# Patient Record
Sex: FEMALE | Race: WHITE | NOT HISPANIC OR LATINO | Employment: OTHER | URBAN - METROPOLITAN AREA
[De-identification: names, ages, dates, MRNs, and addresses within clinical notes are randomized per-mention and may not be internally consistent; named-entity substitution may affect disease eponyms.]

---

## 2017-01-09 ENCOUNTER — HOSPITAL ENCOUNTER (OUTPATIENT)
Dept: LAB | Facility: MEDICAL CENTER | Age: 72
End: 2017-01-09
Attending: FAMILY MEDICINE
Payer: MEDICARE

## 2017-01-09 DIAGNOSIS — E55.9 VITAMIN D DEFICIENCY: ICD-10-CM

## 2017-01-09 DIAGNOSIS — E03.8 SUBCLINICAL HYPOTHYROIDISM: ICD-10-CM

## 2017-01-09 LAB
25(OH)D3 SERPL-MCNC: 61 NG/ML (ref 30–100)
TSH SERPL DL<=0.005 MIU/L-ACNC: 2.69 UIU/ML (ref 0.3–3.7)

## 2017-01-09 PROCEDURE — 82306 VITAMIN D 25 HYDROXY: CPT

## 2017-01-09 PROCEDURE — 36415 COLL VENOUS BLD VENIPUNCTURE: CPT

## 2017-01-09 PROCEDURE — 84443 ASSAY THYROID STIM HORMONE: CPT

## 2017-01-11 ENCOUNTER — PATIENT MESSAGE (OUTPATIENT)
Dept: MEDICAL GROUP | Age: 72
End: 2017-01-11

## 2017-01-11 NOTE — TELEPHONE ENCOUNTER
From: Lia Da Silva  To: Meena Landon M.D.  Sent: 1/11/2017 12:48 PM PST  Subject: Non-Urgent Medical Question          Dr. Landon,    Will you be able to give me a pelvic exam when I see you for my appointment on January 20?    Lia Da Silva

## 2017-01-20 ENCOUNTER — OFFICE VISIT (OUTPATIENT)
Dept: MEDICAL GROUP | Age: 72
End: 2017-01-20
Payer: MEDICARE

## 2017-01-20 VITALS
DIASTOLIC BLOOD PRESSURE: 78 MMHG | SYSTOLIC BLOOD PRESSURE: 102 MMHG | HEART RATE: 86 BPM | HEIGHT: 65 IN | WEIGHT: 130 LBS | TEMPERATURE: 98.3 F | OXYGEN SATURATION: 98 % | BODY MASS INDEX: 21.66 KG/M2

## 2017-01-20 DIAGNOSIS — E78.5 DYSLIPIDEMIA: ICD-10-CM

## 2017-01-20 DIAGNOSIS — E78.5 HYPERLIPIDEMIA WITH TARGET LDL LESS THAN 130: ICD-10-CM

## 2017-01-20 DIAGNOSIS — Z23 NEED FOR VACCINATION: ICD-10-CM

## 2017-01-20 DIAGNOSIS — D72.819 LEUKOPENIA, UNSPECIFIED TYPE: ICD-10-CM

## 2017-01-20 PROCEDURE — 99214 OFFICE O/P EST MOD 30 MIN: CPT | Performed by: FAMILY MEDICINE

## 2017-01-20 RX ORDER — PRAVASTATIN SODIUM 20 MG
TABLET ORAL
Qty: 90 TAB | Refills: 2 | Status: SHIPPED | OUTPATIENT
Start: 2017-01-20 | End: 2017-02-17

## 2017-01-20 NOTE — PROGRESS NOTES
Subjective:     Chief Complaint   Patient presents with   • Hyperlipidemia     lab review     Lia Da Silva is a 71 y.o. female here today for issues listed below    Dyslipidemia: Chronic condition. Last labs are at goal.  Currently taking pravastatin as directed.  Denies side effects--no myalgias or abdominal pain.  Reports diet is: appropriate diet  She is exercising regularly.  She denies dizziness, claudication, or chest pain.  She has chronic leukopenia. She has seen hematology in the past. No additional evaluation was advised. She denies fevers chills or sweats. Denies unusually frequent illness.  She is due for immunizations for TDAP and pneumococcal vaccine. She would prefer to have these at a pharmacy if co-pay is lower.         Allergies: Review of patient's allergies indicates no known allergies.  Current medicines (including changes today)  Current Outpatient Prescriptions   Medication Sig Dispense Refill   • tetanus-diphth-acell pertussis (BOOSTRIX, AGES 7 & OLDER,) 5-2.5-18.5 LF-MCG/0.5 Suspension 0.5 mL by Intramuscular route Once PRN for up to 1 dose. May use Adacel or Boostrix. 0.5 mL 0   • pneumococcal vaccine (PNEUMOVAX-23) 25 MCG/0.5ML Injection 0.5 mL by Intramuscular route Once for 1 dose. 0.5 mL 0   • pravastatin (PRAVACHOL) 20 MG Tab TAKE 1 TABLET ORALLY AT BEDTIME 90 Tab 2   • Cholecalciferol (VITAMIN D3) 2000 UNIT CAPS Take 6,000 Units by mouth.     • aspirin EC (ECOTRIN) 81 MG TBEC Take 81 mg by mouth every day.         No current facility-administered medications for this visit.       She  has a past medical history of Leukopenia (3/21/2012); Hyperlipidemia LDL goal < 130 (3/21/2012); Basal cell carcinoma of skin; and Melanoma of back (CMS-HCC) (6/2016).  Health Maintenance: Mammogram is scheduled for next month  ROS  Constitutional: Negative for fever, chills/sweats   Respiratory: Negative for shortness of breath, SIMMS  Cardiovascular: Negative for chest pain or pressure  GI/:  "Negative for diarrhea/constipation / urinary difficulty  All other systems reviewed and are negative except as per HPI.        Objective:     Blood pressure 102/78, pulse 86, temperature 36.8 °C (98.3 °F), height 1.638 m (5' 4.5\"), weight 58.968 kg (130 lb), last menstrual period 03/01/2004, SpO2 98 %. Body mass index is 21.98 kg/(m^2).  Physical Exam:  Alert, oriented in no acute distress.  Eye contact is good, speech goal directed, affect calm  HEENT: conjunctiva non-injected, sclera non-icteric.  Pinna normal without skin lesions. TM pearly gray.   Oral mucous membranes pink and moist with no lesions.  Neck No adenopathy or masses in the neck or supraclavicular regions.  No carotid bruits. No thyromegaly  Lungs: clear to auscultation bilaterally with good excursion.  CV: regular rate and rhythm.  Abdomen No CVAT  Ext: no edema, no tenderness to palpation over shins      Assessment and Plan:     Chronic condition of hyperlipidemia has been stable and very well controlled. Continue current medications.  Treatment Changes: None  Lia was seen today for hyperlipidemia.    Diagnoses and all orders for this visit:    Hyperlipidemia with target LDL less than 130  -     LIPID PROFILE; Future    Need for vaccination  -     tetanus-diphth-acell pertussis (BOOSTRIX, AGES 7 & OLDER,) 5-2.5-18.5 LF-MCG/0.5 Suspension; 0.5 mL by Intramuscular route Once PRN for up to 1 dose. May use Adacel or Boostrix.  -     pneumococcal vaccine (PNEUMOVAX-23) 25 MCG/0.5ML Injection; 0.5 mL by Intramuscular route Once for 1 dose.    Dyslipidemia  -     pravastatin (PRAVACHOL) 20 MG Tab; TAKE 1 TABLET ORALLY AT BEDTIME  -     COMP METABOLIC PANEL; Future  -     LIPID PROFILE; Future  -     TSH WITH REFLEX TO FT4; Future    Leukopenia, unspecified type        Followup: Return in about 9 months (around 11/3/2017) for AWV with health . sooner should new symptoms or problems arise.           "

## 2017-01-20 NOTE — MR AVS SNAPSHOT
"        Lia Kathrine Da Silva   2017 8:50 AM   Office Visit   MRN: [id number]    Department:  [address] [place] [address]   Dept Phone:  [phone]    Description:  Female : 1945   Provider:  Meena Landon M.D.           Reason for Visit     Hyperlipidemia lab review      Allergies as of 2017     No Known Allergies      You were diagnosed with     Hyperlipidemia with target LDL less than 130   [207149]       Need for vaccination   [989965]       Dyslipidemia   [040592]       Leukopenia, unspecified type   [1792843]         Vital Signs     Blood Pressure Pulse Temperature Height Weight Body Mass Index    102/78 mmHg 86 36.8 °C (98.3 °F) 1.638 m (5' 4.5\") 58.968 kg (130 lb) 21.98 kg/m2    Oxygen Saturation Last Menstrual Period Smoking Status             98% 2004 Never Smoker          Basic Information     Date Of Birth Sex Race Ethnicity Preferred Language    1945 Female White Non- English      Your appointments     2017 10:10 AM   ANNUAL EXAM PREVENTATIVE with Meena Landon M.D.   [address])    [address] [place] [address] [place] [address] [phone]            2017 10:40 AM   MA SCRN10 with S HENRY MG 1   [facility] IM[facility] MAMMOGRAPHY ([place])    [address]  [place] [address] [phone]           No deodorant, powder, perfume or lotion under the arm or breast area.              Problem List              ICD-10-CM Priority Class Noted - Resolved    Leukopenia D72.819   3/21/2012 - Present    Hyperlipidemia with target LDL less than 130 E78.5   3/21/2012 - Present    Osteopenia with high risk of fracture M85.80   3/21/2012 - Present    Preventative health care Z00.00   2012 - Present    H/O basal cell carcinoma excision Z85.828, Z98.890   2015 - Present    H/O melanoma excision Z98.890, Z85.820   7/15/2016 - Present      Health Maintenance        Date Due Completion Dates    IMM " DTaP/Tdap/Td Vaccine (1 - Tdap) 11/1/1964 ---    IMM PNEUMOCOCCAL 65+ (ADULT) LOW/MEDIUM RISK SERIES (2 of 2 - PPSV23) 5/29/2016 5/29/2015, 10/31/2010    MAMMOGRAM 11/4/2016 11/4/2015, 9/11/2014, 4/18/2014, 9/10/2013, 8/13/2012, 8/10/2011, 8/9/2010, 8/9/2010, 7/29/2009, 7/29/2009    BONE DENSITY 11/4/2017 11/4/2015, 3/28/2012    COLONOSCOPY 10/15/2019 10/15/2009, 10/15/2009 (Done), 10/15/2009, 10/1/2004 (Done)    Override on 10/15/2009: Done    Override on 10/1/2004: Done            Current Immunizations     13-VALENT PCV PREVNAR 5/29/2015  1:02 PM    Hep A/HEP B Combined Vaccine (TwinRix) 7/24/2009, 1/30/2009, 12/19/2008    IPV 7/15/2013    Influenza TIV (IM) 9/25/2014    Influenza Vaccine Adult HD 10/3/2016, 9/4/2015    Influenza Vaccine Quad Inj (Preserved) 9/24/2013, 10/14/2011, 10/31/2010    Pneumococcal polysaccharide vaccine (PPSV-23) 10/31/2010    SHINGLES VACCINE 7/23/2008, 11/1/2006    Yellow Fever Vaccine 6/4/2013      Below and/or attached are the medications your provider expects you to take. Review all of your home medications and newly ordered medications with your provider and/or pharmacist. Follow medication instructions as directed by your provider and/or pharmacist. Please keep your medication list with you and share with your provider. Update the information when medications are discontinued, doses are changed, or new medications (including over-the-counter products) are added; and carry medication information at all times in the event of emergency situations     Allergies:  No Known Allergies          Medications  Valid as of: January 20, 2017 -  9:10 AM    Generic Name Brand Name Tablet Size Instructions for use    Aspirin (Tablet Delayed Response) ECOTRIN 81 MG Take 81 mg by mouth every day.          Cholecalciferol (Cap) Vitamin D3 2000 UNIT Take 6,000 Units by mouth.        Pneumococcal Vac Polyvalent (Injection) PNEUMOVAX-23 25 MCG/0.5ML 0.5 mL by Intramuscular route Once for 1 dose.         Pravastatin Sodium (Tab) PRAVACHOL 20 MG TAKE 1 TABLET ORALLY AT BEDTIME        Tetanus-Diphth-Acell Pertussis (Suspension) BOOSTRIX (Ages 7 & Older) 5-2.5-18.5 LF-MCG/0.5 0.5 mL by Intramuscular route Once PRN for up to 1 dose. May use Adacel or Boostrix.        .                 Medicines prescribed today were sent to:     [facility]/PHARMACY #[id number] - [place], [place] - [address]    [address] [place] [address]    Phone: [phone] Fax: [phone]    Open 24 Hours?: No      Medication refill instructions:       If your prescription bottle indicates you have medication refills left, it is not necessary to call your provider’s office. Please contact your pharmacy and they will refill your medication.    If your prescription bottle indicates you do not have any refills left, you may request refills at any time through one of the following ways: The online [employer] system (except Urgent Care), by calling your provider’s office, or by asking your pharmacy to contact your provider’s office with a refill request. Medication refills are processed only during regular business hours and may not be available until the next business day. Your provider may request additional information or to have a follow-up visit with you prior to refilling your medication.   *Please Note: Medication refills are assigned a new Rx number when refilled electronically. Your pharmacy may indicate that no refills were authorized even though a new prescription for the same medication is available at the pharmacy. Please request the medicine by name with the pharmacy before contacting your provider for a refill.        Your To Do List     Future Labs/Procedures Complete By Expires    COMP METABOLIC PANEL  7/19/2017 1/20/2018    LIPID PROFILE  7/19/2017 1/20/2018    TSH WITH REFLEX TO FT4  7/19/2017 1/20/2018      Other Notes About Your Plan                  [employer] Access Code: Activation code not generated  Current [employer] Status: Active

## 2017-01-24 ENCOUNTER — RX ONLY (OUTPATIENT)
Age: 72
Setting detail: RX ONLY
End: 2017-01-24

## 2017-01-31 ENCOUNTER — TELEPHONE (OUTPATIENT)
Dept: MEDICAL GROUP | Age: 72
End: 2017-01-31

## 2017-01-31 DIAGNOSIS — Z23 NEED FOR VACCINATION: ICD-10-CM

## 2017-01-31 NOTE — TELEPHONE ENCOUNTER
Phone Number Called: [phone] (home)     Message: Spoke with patient and made an MA Visit to get vaccines done 2/1/17    Left Message for patient to call back: N\A

## 2017-01-31 NOTE — TELEPHONE ENCOUNTER
Phone Number Called: [phone] (home)     Message: left message for to call back in regard to needing vaccines PPSV23-Tdap    Left Message for patient to call back: yes

## 2017-01-31 NOTE — TELEPHONE ENCOUNTER
Please addend documentation below. One message says pt needs PCV-13 and the other says PPSV-23.  (she is due for PPSV-23 and Tdap - those orders are signed)  Meena Landon MD  [facility] [address] [place] [address]

## 2017-01-31 NOTE — TELEPHONE ENCOUNTER
1. Caller Name: Lia Da Silva                                         Call Back Number: [phone] (home)       Patient approves a detailed voicemail message: yes    2. SPECIFIC Action To Be Taken: Orders pending, please sign.    3. Diagnosis/Clinical Reason for Request: Tdap, PPSV23    4. Specialty & Provider Name/Lab/Imaging Location: NA    5. Is appointment scheduled for requested order/referral: no    Patient informed they will receive a return phone call from the office ONLY if there are any questions before processing their request. Advised to call back if they haven't received a call from the referral department in 5 days.

## 2017-02-01 ENCOUNTER — APPOINTMENT (OUTPATIENT)
Dept: MEDICAL GROUP | Age: 72
End: 2017-02-01
Payer: MEDICARE

## 2017-02-02 ENCOUNTER — NON-PROVIDER VISIT (OUTPATIENT)
Dept: MEDICAL GROUP | Age: 72
End: 2017-02-02
Payer: MEDICARE

## 2017-02-02 DIAGNOSIS — Z23 NEED FOR VACCINATION: ICD-10-CM

## 2017-02-02 PROCEDURE — 90715 TDAP VACCINE 7 YRS/> IM: CPT | Performed by: FAMILY MEDICINE

## 2017-02-02 PROCEDURE — G0009 ADMIN PNEUMOCOCCAL VACCINE: HCPCS | Performed by: FAMILY MEDICINE

## 2017-02-02 PROCEDURE — 90472 IMMUNIZATION ADMIN EACH ADD: CPT | Performed by: FAMILY MEDICINE

## 2017-02-02 PROCEDURE — 90732 PPSV23 VACC 2 YRS+ SUBQ/IM: CPT | Performed by: FAMILY MEDICINE

## 2017-02-02 PROCEDURE — 99999 PR NO CHARGE: CPT | Performed by: FAMILY MEDICINE

## 2017-02-02 NOTE — MR AVS SNAPSHOT
Lia Da Silva   2017 9:15 AM   Non-Provider Visit   MRN: [id number]    Department:  [address] [place] [address]   Dept Phone:  [phone]    Description:  Female : 1945   Provider:  AMOS MONTELONGO MA           Reason for Visit     Immunizations Tdap, PPSV23      Allergies as of 2017     No Known Allergies      You were diagnosed with     Need for vaccination   [[id number]]         Vital Signs     Last Menstrual Period Smoking Status                2004 Never Smoker           Basic Information     Date Of Birth Sex Race Ethnicity Preferred Language    1945 Female White Non- English      Your appointments     2017 10:10 AM   ANNUAL EXAM PREVENTATIVE with Meena Landon M.D.   [facility] [address] [place] [address]    [address] [place] [address]   [phone]            2017 10:40 AM   MA SCRN10 with S HENRY MG 1   [facility] IMAGING [facility] MAMMOGRAPHY ([place])    [address]  [place] [address] [phone]           No deodorant, powder, perfume or lotion under the arm or breast area.              Problem List              ICD-10-CM Priority Class Noted - Resolved    Leukopenia D72.819   3/21/2012 - Present    Hyperlipidemia with target LDL less than 130 E78.5   3/21/2012 - Present    Osteopenia with high risk of fracture M85.80   3/21/2012 - Present    Preventative health care Z00.00   2012 - Present    H/O basal cell carcinoma excision Z85.828, Z98.890   2015 - Present    H/O melanoma excision Z98.890, Z85.820   7/15/2016 - Present      Health Maintenance        Date Due Completion Dates    MAMMOGRAM 2016, 2014, 2014, 9/10/2013, 2012, 8/10/2011, 2010, 2010, 2009, 2009    BONE DENSITY 2017, 3/28/2012    COLONOSCOPY 10/15/2019 10/15/2009, 10/15/2009 (Done), 10/15/2009, 10/1/2004 (Done)    Override on 10/15/2009: Done    Override on  10/1/2004: Done    IMM DTaP/Tdap/Td Vaccine (2 - Td) 2/2/2027 2/2/2017            Current Immunizations     13-VALENT PCV PREVNAR 5/29/2015  1:02 PM    Hep A/HEP B Combined Vaccine (TwinRix) 7/24/2009, 1/30/2009, 12/19/2008    IPV 7/15/2013    Influenza TIV (IM) 9/25/2014    Influenza Vaccine Adult HD 10/3/2016, 9/4/2015    Influenza Vaccine Quad Inj (Preserved) 9/24/2013, 10/14/2011, 10/31/2010    Pneumococcal polysaccharide vaccine (PPSV-23) 2/2/2017, 10/31/2010    SHINGLES VACCINE 7/23/2008, 11/1/2006    Tdap Vaccine 2/2/2017    Yellow Fever Vaccine 6/4/2013      Below and/or attached are the medications your provider expects you to take. Review all of your home medications and newly ordered medications with your provider and/or pharmacist. Follow medication instructions as directed by your provider and/or pharmacist. Please keep your medication list with you and share with your provider. Update the information when medications are discontinued, doses are changed, or new medications (including over-the-counter products) are added; and carry medication information at all times in the event of emergency situations     Allergies:  No Known Allergies          Medications  Valid as of: February 02, 2017 -  9:55 AM    Generic Name Brand Name Tablet Size Instructions for use    Aspirin (Tablet Delayed Response) ECOTRIN 81 MG Take 81 mg by mouth every day.          Cholecalciferol (Cap) Vitamin D3 2000 UNIT Take 6,000 Units by mouth.        Pravastatin Sodium (Tab) PRAVACHOL 20 MG TAKE 1 TABLET ORALLY AT BEDTIME        Tetanus-Diphth-Acell Pertussis (Suspension) BOOSTRIX (Ages 7 & Older) 5-2.5-18.5 LF-MCG/0.5 0.5 mL by Intramuscular route Once PRN for up to 1 dose. May use Adacel or Boostrix.        .                 Medicines prescribed today were sent to:     [facility]/PHARMACY #[id number] - [place], [place] - [address]    [address] [place] [address]    Phone: [phone] Fax: [phone]    Open 24 Hours?: No         Medication refill instructions:       If your prescription bottle indicates you have medication refills left, it is not necessary to call your provider’s office. Please contact your pharmacy and they will refill your medication.    If your prescription bottle indicates you do not have any refills left, you may request refills at any time through one of the following ways: The online [employer] system (except Urgent Care), by calling your provider’s office, or by asking your pharmacy to contact your provider’s office with a refill request. Medication refills are processed only during regular business hours and may not be available until the next business day. Your provider may request additional information or to have a follow-up visit with you prior to refilling your medication.   *Please Note: Medication refills are assigned a new Rx number when refilled electronically. Your pharmacy may indicate that no refills were authorized even though a new prescription for the same medication is available at the pharmacy. Please request the medicine by name with the pharmacy before contacting your provider for a refill.        Other Notes About Your Plan                  [employer] Access Code: Activation code not generated  Current [employer] Status: Active

## 2017-02-02 NOTE — PROGRESS NOTES
"Lia Da Silva is a 71 y.o. female here for a non-provider visit for:   TDAP, PPSV23    Reason for immunization: continue or complete series started at the office  Immunization records indicate need for vaccine: Yes  Minimum interval has been met for this vaccine: Yes  ABN completed: Not Indicated    VIS Dated  4/25/15 was given to patient Yes  All IAC Questionnaire questions were answered “No.\"    Patient tolerated injection and no adverse effects were observed or reported .    Pt scheduled for next dose in series: No  Verified by DS      FYI ONLY    Phone Number Called: [phone] (home)     Message: Patient was needing a letter for family stating that she has no symptoms of dementia.    Left Message for patient to call back: yes            "

## 2017-02-03 NOTE — PROGRESS NOTES
Patient can request a release of records of her last AWV (July 2016) which indicates her cognitive screen is normal.  SAM

## 2017-02-03 NOTE — PROGRESS NOTES
Left message for patient to return call.   Please inform patient to contact medical records to request her last AWV at [phone]

## 2017-02-07 RX ORDER — PRAVASTATIN SODIUM 20 MG
TABLET ORAL
Qty: 90 TAB | Refills: 2 | Status: SHIPPED | OUTPATIENT
Start: 2017-02-07 | End: 2018-02-26 | Stop reason: SDUPTHER

## 2017-02-07 NOTE — TELEPHONE ENCOUNTER
Refill done and sent electronically to pharmacy selected for encounter.  Meena Landon MD  [facility] [address] [place] [address]

## 2017-02-17 ENCOUNTER — OFFICE VISIT (OUTPATIENT)
Dept: MEDICAL GROUP | Age: 72
End: 2017-02-17
Payer: MEDICARE

## 2017-02-17 VITALS
DIASTOLIC BLOOD PRESSURE: 86 MMHG | SYSTOLIC BLOOD PRESSURE: 122 MMHG | HEIGHT: 66 IN | WEIGHT: 131.2 LBS | BODY MASS INDEX: 21.08 KG/M2 | HEART RATE: 78 BPM | OXYGEN SATURATION: 100 % | TEMPERATURE: 100.9 F

## 2017-02-17 DIAGNOSIS — E78.5 HYPERLIPIDEMIA WITH TARGET LDL LESS THAN 130: ICD-10-CM

## 2017-02-17 DIAGNOSIS — M85.80 OSTEOPENIA WITH HIGH RISK OF FRACTURE: ICD-10-CM

## 2017-02-17 DIAGNOSIS — Z01.419 WELL FEMALE EXAM WITH ROUTINE GYNECOLOGICAL EXAM: Primary | ICD-10-CM

## 2017-02-17 DIAGNOSIS — R79.89 ELEVATED TSH: ICD-10-CM

## 2017-02-17 DIAGNOSIS — D72.819 LEUKOPENIA, UNSPECIFIED TYPE: ICD-10-CM

## 2017-02-17 PROCEDURE — G0101 CA SCREEN;PELVIC/BREAST EXAM: HCPCS | Performed by: FAMILY MEDICINE

## 2017-02-17 PROCEDURE — G8432 DEP SCR NOT DOC, RNG: HCPCS | Performed by: FAMILY MEDICINE

## 2017-02-17 RX ORDER — IMIQUIMOD 12.5 MG/.25G
CREAM TOPICAL
COMMUNITY
Start: 2017-01-24 | End: 2018-05-07

## 2017-02-17 NOTE — PROGRESS NOTES
SUBJECTIVE: 71 y.o. female for annual routine gynecologic exam  Chief Complaint   Patient presents with   • Gynecologic Exam       Obstetric History       T0      TAB0   SAB0   E0   M0   L1    Obstetric Comments   And adopted 2 children        History   Sexual Activity   • Sexual Activity:   • Partners: Male     Sexual history: currently sexually active, single partner   H/O Abnormal Pap no  She  reports that she has never smoked. She has never used smokeless tobacco.    LMP Date: 97   Allergies: Review of patient's allergies indicates no known allergies.     ROS:    Reports mild, variable menopause symptoms of hot flashes, night sweats, sleep disruption, mood changes.Reports vaginal dryness.   No significant bloating/fluid retention, pelvic pain, or dyspareunia. No vaginal discharge   No breast tenderness, mass, nipple discharge, changes in size or contour, or abnormal cyclic discomfort.  No urinary tract symptoms, no incontinence, no polydipsia, polyuria,  No abdominal pain, change in bowel habits, black or bloody stools.    No unusual headaches, no visual changes, menstrual migraines   No prolonged cough. No dyspnea or chest pain on exertion.  No depression, labile mood, anxiety, libido changes, insomnia.  No temperature intolerance.  Sees Derm for skin cancer monitoring     Exercise: moderate regular exercise program walks. Plans some yoga  Preventive Care mammo is scheduled.     Current medicines (including changes today)  Current Outpatient Prescriptions   Medication Sig Dispense Refill   • imiquimod (ALDARA) 5 % cream      • pravastatin (PRAVACHOL) 20 MG Tab TAKE 1 TABLET ORALLY AT BEDTIME 90 Tab 2   • Cholecalciferol (VITAMIN D3) 2000 UNIT CAPS Take 6,000 Units by mouth.     • aspirin EC (ECOTRIN) 81 MG TBEC Take 81 mg by mouth every day.         No current facility-administered medications for this visit.     She  has a past medical history of Leukopenia (3/21/2012); Hyperlipidemia LDL  "goal < 130 (3/21/2012); Basal cell carcinoma of skin; and Melanoma of back (CMS-HCC) (6/2016).  She  has past surgical history that includes tonsillectomy; primary c section; breast biopsy; bone marrow aspiration (11/7/2012); and bone marrow biopsy, ndl/trocar (11/7/2012).     Family History:   Family History   Problem Relation Age of Onset   • Genetic Father      alzheimers d. 72   • Hyperlipidemia Father    • Hypertension Father    • Cancer Mother      lymphoma d. 86   • Diabetes Brother      resolved with lifestyle mod       OBJECTIVE:   /86 mmHg  Pulse 78  Temp(Src) 38.3 °C (100.9 °F)  Ht 1.679 m (5' 6.1\")  Wt 59.512 kg (131 lb 3.2 oz)  BMI 21.11 kg/m2  SpO2 100%  LMP 11/01/1997  Breastfeeding? No  Body mass index is 21.11 kg/(m^2).    HEAD AND NECK:  Ears normal.  Throat, oral cavity and tongue normal.  Neck supple. No adenopathy or masses in the neck or supraclavicular regions.  No carotid bruits. No thyromegaly. NEURO: Cranial nerves are normal. DTR's normal and symmetric.  CHEST:  Clear, good air entry, no wheezes or rales. HEART:  Regular rate and rhythm.  S1 and S2 normal.   No edema or JVD. ABDOMEN:  Soft without tenderness, guarding, mass or organomegaly.  No CVA tenderness or inguinal adenopathy. EXTREMITIES:  Extremities, reflexes and peripheral pulses are normal. SKIN: color normal, vascularity normal, no edema, temperature normal   No rashes or suspicious skin lesions noted.     Breast Exam: Performed with instruction during examination. No axillary lymphadenopathy, no skin changes, no dominant masses. No nipple retraction  Pelvic Exam -  Normal external genitalia with no lesions. Vaginal Mucosa:  atrophic vaginal mucosa . Cervix with no visible lesions. No cervical motion tenderness. Uterus is normal sized with no masses. No adnexal tenderness or enlargement appreciated. Thin Prep Pap is not obtained, vaginal swab is not obtained  Rectal: sphincter tone normal, no mass    <ASSESSMENT " and PLAN>  1. Well female exam with routine gynecological exam     2. Hyperlipidemia with target LDL less than 130  LIPID PROFILE    COMP METABOLIC PANEL   3. Elevated TSH  TSH WITH REFLEX TO FT4   4. Leukopenia, unspecified type     5. Osteopenia with high risk of fracture         Discussed  breast self exam, mammography screening, menopause, osteoporosis, adequate intake of calcium and vitamin D, diet and exercise   Recheck DEXA later this year. If continued high risk fracture will advise treatment.   Follow-up in 2 years for next Gyn exam  Next office visit for recheck of chronic medical conditions is due in 1 year.

## 2017-02-17 NOTE — Clinical Note
February 17, 2017         Lia Da Silva  [address] [place] [address]        Dear Lia:      On 7/15/16 you had a completely normal cognition screen.  We will check again in November.         Sincerely,      Meena Landon M.D.    Electronically Signed

## 2017-02-17 NOTE — MR AVS SNAPSHOT
"        Lia Da Silva   2017 10:10 AM   Office Visit   MRN: [id number]    Department:  [address] [place] [address]   Dept Phone:  [phone]    Description:  Female : 1945   Provider:  Meena Landon M.D.           Reason for Visit     Gynecologic Exam           Allergies as of 2017     No Known Allergies      You were diagnosed with     Hyperlipidemia with target LDL less than 130   [211525]       Elevated TSH   [613964]       Leukopenia, unspecified type   [4201623]       Osteopenia with high risk of fracture   [6858457]         Vital Signs     Blood Pressure Pulse Temperature Height Weight Body Mass Index    122/86 mmHg 78 38.3 °C (100.9 °F) 1.679 m (5' 6.1\") 59.512 kg (131 lb 3.2 oz) 21.11 kg/m2    Oxygen Saturation Last Menstrual Period Breastfeeding? Smoking Status          100% 1997 No Never Smoker         Basic Information     Date Of Birth Sex Race Ethnicity Preferred Language    1945 Female White Non- English      Your appointments     2017 10:40 AM   MA SCRN10 with S [place] MG 1   [facility] ([place])    [address]  [place] [address] [phone]           No deodorant, powder, perfume or lotion under the arm or breast area.            2017  7:30 AM   Established Patient with Meena Landon M.D.   [address] [place] [address] [phone]           You will be receiving a confirmation call a few days before your appointment from our automated call confirmation system.              Problem List              ICD-10-CM Priority Class Noted - Resolved    Leukopenia D72.819   3/21/2012 - Present    Hyperlipidemia with target LDL less than 130 E78.5   3/21/2012 - Present    Osteopenia with high risk of fracture M85.80   3/21/2012 - Present    Preventative health care Z00.00   2012 - Present    H/O basal cell carcinoma excision Z85.828, " Z98.890   5/29/2015 - Present    H/O melanoma excision Z98.890, Z85.820   7/15/2016 - Present      Health Maintenance        Date Due Completion Dates    MAMMOGRAM 11/4/2016 11/4/2015, 9/11/2014, 4/18/2014, 9/10/2013, 8/13/2012, 8/10/2011, 8/9/2010, 8/9/2010, 7/29/2009, 7/29/2009    BONE DENSITY 11/4/2017 11/4/2015, 3/28/2012    COLONOSCOPY 10/15/2019 10/15/2009, 10/15/2009 (Done), 10/15/2009, 10/1/2004 (Done)    Override on 10/15/2009: Done    Override on 10/1/2004: Done    IMM DTaP/Tdap/Td Vaccine (2 - Td) 2/2/2027 2/2/2017            Current Immunizations     13-VALENT PCV PREVNAR 5/29/2015  1:02 PM    Hep A/HEP B Combined Vaccine (TwinRix) 7/24/2009, 7/24/2009, 1/30/2009, 1/30/2009, 12/19/2008, 12/19/2008    IPV 7/15/2013    Influenza TIV (IM) 9/25/2014    Influenza Vaccine Adult HD 10/3/2016, 9/4/2015    Influenza Vaccine Quad Inj (Preserved) 9/24/2013, 10/14/2011, 10/31/2010    Pneumococcal polysaccharide vaccine (PPSV-23) 2/2/2017, 10/31/2010    SHINGLES VACCINE 7/23/2008, 11/1/2006    Tdap Vaccine 2/2/2017    Yellow Fever Vaccine 6/4/2013      Below and/or attached are the medications your provider expects you to take. Review all of your home medications and newly ordered medications with your provider and/or pharmacist. Follow medication instructions as directed by your provider and/or pharmacist. Please keep your medication list with you and share with your provider. Update the information when medications are discontinued, doses are changed, or new medications (including over-the-counter products) are added; and carry medication information at all times in the event of emergency situations     Allergies:  No Known Allergies          Medications  Valid as of: February 17, 2017 - 10:36 AM    Generic Name Brand Name Tablet Size Instructions for use    Aspirin (Tablet Delayed Response) ECOTRIN 81 MG Take 81 mg by mouth every day.          Cholecalciferol (Cap) Vitamin D3 2000 UNIT Take 6,000 Units by mouth.           Imiquimod (Cream) ALDARA 5 %         Pravastatin Sodium (Tab) PRAVACHOL 20 MG TAKE 1 TABLET ORALLY AT BEDTIME        .                 Medicines prescribed today were sent to:     [facility]ARMACY #[id number] - [place], [place] - [address]    [address] [place] [address]    Phone: [phone] Fax: [phone]    Open 24 Hours?: No      Medication refill instructions:       If your prescription bottle indicates you have medication refills left, it is not necessary to call your provider’s office. Please contact your pharmacy and they will refill your medication.    If your prescription bottle indicates you do not have any refills left, you may request refills at any time through one of the following ways: The online [email or web address] system (except Urgent Care), by calling your provider’s office, or by asking your pharmacy to contact your provider’s office with a refill request. Medication refills are processed only during regular business hours and may not be available until the next business day. Your provider may request additional information or to have a follow-up visit with you prior to refilling your medication.   *Please Note: Medication refills are assigned a new Rx number when refilled electronically. Your pharmacy may indicate that no refills were authorized even though a new prescription for the same medication is available at the pharmacy. Please request the medicine by name with the pharmacy before contacting your provider for a refill.        Your To Do List     Future Labs/Procedures Complete By Expires    COMP METABOLIC PANEL  10/16/2017 2/17/2018    LIPID PROFILE  10/16/2017 2/17/2018    TSH WITH REFLEX TO FT4  10/16/2017 2/17/2018      Other Notes About Your Plan                  [email or web address] Access Code: Activation code not generated  Current [email or web address] Status: Active

## 2017-02-28 ENCOUNTER — HOSPITAL ENCOUNTER (OUTPATIENT)
Dept: RADIOLOGY | Facility: MEDICAL CENTER | Age: 72
End: 2017-02-28
Attending: FAMILY MEDICINE
Payer: MEDICARE

## 2017-02-28 DIAGNOSIS — Z13.9 SCREENING: ICD-10-CM

## 2017-02-28 PROCEDURE — 77063 BREAST TOMOSYNTHESIS BI: CPT

## 2017-03-07 ENCOUNTER — HOSPITAL ENCOUNTER (OUTPATIENT)
Dept: RADIOLOGY | Facility: MEDICAL CENTER | Age: 72
End: 2017-03-07
Attending: FAMILY MEDICINE
Payer: MEDICARE

## 2017-03-07 DIAGNOSIS — R92.8 ABNORMAL MAMMOGRAM: ICD-10-CM

## 2017-03-07 PROCEDURE — 76642 ULTRASOUND BREAST LIMITED: CPT | Mod: RT

## 2017-03-07 PROCEDURE — G0206 DX MAMMO INCL CAD UNI: HCPCS | Mod: RT

## 2017-03-27 ENCOUNTER — PATIENT MESSAGE (OUTPATIENT)
Dept: MEDICAL GROUP | Age: 72
End: 2017-03-27

## 2017-03-27 DIAGNOSIS — R92.8 ABNORMAL MAMMOGRAM OF RIGHT BREAST: ICD-10-CM

## 2017-03-27 NOTE — TELEPHONE ENCOUNTER
From: Lia Da Silva  To: Meena Landon M.D.  Sent: 3/27/2017 12:58 PM PDT  Subject: Test Result Question    Hello Dr. Landon,    As you know I had a mammography examination and an ultrasound on March 7, 2017. I was asked to make an appointment in 6 months for another ultrasound. When I tried to make that appointment today I was told that I needed to do that through you.  So please keep me posted on that so I can put it on my calendar.    Thank you,  Lia Da Silva

## 2017-04-25 ENCOUNTER — TELEPHONE (OUTPATIENT)
Dept: MEDICAL GROUP | Age: 72
End: 2017-04-25

## 2017-04-25 NOTE — TELEPHONE ENCOUNTER
Phone Number Called: [phone] (home)     Message: Appointment scheduled with Dr. Graham    Left Message for patient to call back: N\A

## 2017-04-26 PROBLEM — Z85.820 PERSONAL HISTORY OF MALIGNANT MELANOMA OF SKIN: Status: ACTIVE | Noted: 2017-04-26

## 2017-04-26 PROBLEM — C44.319 BASAL CELL CARCINOMA OF SKIN OF OTHER PARTS OF FACE: Status: ACTIVE | Noted: 2017-04-26

## 2017-04-26 PROBLEM — D04.72 CARCINOMA IN SITU OF SKIN OF LEFT LOWER LIMB, INCLUDING HIP: Status: ACTIVE | Noted: 2017-04-26

## 2017-05-02 ENCOUNTER — RX ONLY (OUTPATIENT)
Age: 72
Setting detail: RX ONLY
End: 2017-05-02

## 2017-05-08 ENCOUNTER — TELEPHONE (OUTPATIENT)
Dept: MEDICAL GROUP | Age: 72
End: 2017-05-08

## 2017-05-08 NOTE — TELEPHONE ENCOUNTER
ESTABLISHED PATIENT PRE-VISIT PLANNING     Note: Patient will not be contacted if there is no indication to call.     1.  Reviewed note from last office visit with PCP and/or other med group provider: Yes    2.  If any orders were placed at last visit, do we have Results/Consult Notes?        •  Labs - labs order not completed       •  Imaging - Imaging was not ordered at last office visit.       •  Referrals - No referrals were ordered at last office visit.    3.  Immunizations were updated in [facility] using WebIZ?: Yes       •  Web Iz Recommendations: TD    4.  Patient is due for the following Health Maintenance Topics:   There are no preventive care reminders to display for this patient.    - Patient has completed FLU, HEPATITIS A , HEPATITIS B, PNEUMOVAX (PPSV23), PREVNAR (PCV13) , TDAP and ZOSTAVAX (Shingles) Immunization(s) per WebIZ. Chart has been updated.    5.  Patient was not informed to arrive 15 min prior to their scheduled appointment and bring in their medication bottles.

## 2017-05-09 ENCOUNTER — OFFICE VISIT (OUTPATIENT)
Dept: MEDICAL GROUP | Age: 72
End: 2017-05-09
Payer: MEDICARE

## 2017-05-09 VITALS
BODY MASS INDEX: 20.73 KG/M2 | TEMPERATURE: 99.3 F | OXYGEN SATURATION: 98 % | HEIGHT: 66 IN | DIASTOLIC BLOOD PRESSURE: 64 MMHG | WEIGHT: 129 LBS | HEART RATE: 71 BPM | SYSTOLIC BLOOD PRESSURE: 110 MMHG

## 2017-05-09 DIAGNOSIS — R92.8 ABNORMAL MAMMOGRAM OF RIGHT BREAST: ICD-10-CM

## 2017-05-09 DIAGNOSIS — D72.819 LEUKOPENIA, UNSPECIFIED TYPE: ICD-10-CM

## 2017-05-09 DIAGNOSIS — E78.5 HYPERLIPIDEMIA WITH TARGET LDL LESS THAN 130: ICD-10-CM

## 2017-05-09 DIAGNOSIS — M85.80 OSTEOPENIA WITH HIGH RISK OF FRACTURE: ICD-10-CM

## 2017-05-09 PROBLEM — N60.01 CYST OF RIGHT BREAST: Status: ACTIVE | Noted: 2017-05-09

## 2017-05-09 PROCEDURE — 99204 OFFICE O/P NEW MOD 45 MIN: CPT | Performed by: INTERNAL MEDICINE

## 2017-05-09 ASSESSMENT — PAIN SCALES - GENERAL: PAINLEVEL: NO PAIN

## 2017-05-09 NOTE — MR AVS SNAPSHOT
"        Lia Da Silva   2017 4:00 PM   Office Visit   MRN: [id number]    Department:  [address] [place] [address]   Dept Phone:  [phone]    Description:  Female : 1945   Provider:  Gayle Graham M.D.           Reason for Visit     Establish Care           Allergies as of 2017     No Known Allergies      You were diagnosed with     Hyperlipidemia with target LDL less than 130   [085982]       Leukopenia, unspecified type   [7815648]       Osteopenia with high risk of fracture   [2501226]         Vital Signs     Blood Pressure Pulse Temperature Height Weight Body Mass Index    110/64 mmHg 71 37.4 °C (99.3 °F) 1.676 m (5' 5.98\") 58.514 kg (129 lb) 20.83 kg/m2    Oxygen Saturation Last Menstrual Period Breastfeeding? Smoking Status          98% 1997 No Never Smoker         Basic Information     Date Of Birth Sex Race Ethnicity Preferred Language    1945 Female White Non- English      Your appointments     Sep 06, 2017  8:00 AM   MA DX30 with S IVANAAN MG 1   [facility] ([place])    [address]  [facility] [id number] [phone]            Dec 04, 2017  8:20 AM   Established Patient with Gayle Graham M.D.   [address] [id number] [phone]           You will be receiving a confirmation call a few days before your appointment from our automated call confirmation system.              Problem List              ICD-10-CM Priority Class Noted - Resolved    Leukopenia D72.819   3/21/2012 - Present    Hyperlipidemia with target LDL less than 130 E78.5   3/21/2012 - Present    Osteopenia with high risk of fracture M85.80   3/21/2012 - Present    Preventative health care Z00.00   2012 - Present    H/O basal cell carcinoma excision Z85.828, Z98.890   2015 - Present    H/O melanoma excision Z98.890, Z85.820   7/15/2016 - Present      Health Maintenance       " Date Due Completion Dates    BONE DENSITY 11/4/2017 11/4/2015, 3/28/2012    MAMMOGRAM 3/7/2018 3/7/2017, 2/28/2017, 11/4/2015, 9/11/2014, 4/18/2014, 9/10/2013, 8/13/2012, 8/10/2011, 8/9/2010, 8/9/2010, 7/29/2009, 7/29/2009    COLONOSCOPY 10/15/2019 10/15/2009, 10/15/2009 (Done), 10/15/2009, 10/1/2004 (Done)    Override on 10/15/2009: Done    Override on 10/1/2004: Done    IMM DTaP/Tdap/Td Vaccine (2 - Td) 2/2/2027 2/2/2017            Current Immunizations     13-VALENT PCV PREVNAR 5/29/2015  1:02 PM    Hep A/HEP B Combined Vaccine (TwinRix) 7/24/2009, 7/24/2009, 1/30/2009, 1/30/2009, 12/19/2008, 12/19/2008    IPV 7/15/2013    Influenza TIV (IM) 9/25/2014    Influenza Vaccine Adult HD 10/3/2016, 9/4/2015    Influenza Vaccine Quad Inj (Preserved) 9/24/2013, 10/14/2011, 10/31/2010    Pneumococcal polysaccharide vaccine (PPSV-23) 2/2/2017, 10/31/2010    SHINGLES VACCINE 7/23/2008, 11/1/2006    Tdap Vaccine 2/2/2017    Yellow Fever Vaccine 6/4/2013      Below and/or attached are the medications your provider expects you to take. Review all of your home medications and newly ordered medications with your provider and/or pharmacist. Follow medication instructions as directed by your provider and/or pharmacist. Please keep your medication list with you and share with your provider. Update the information when medications are discontinued, doses are changed, or new medications (including over-the-counter products) are added; and carry medication information at all times in the event of emergency situations     Allergies:  No Known Allergies          Medications  Valid as of: May 09, 2017 -  4:50 PM    Generic Name Brand Name Tablet Size Instructions for use    Aspirin (Tablet Delayed Response) ECOTRIN 81 MG Take 81 mg by mouth every day.          Cholecalciferol (Cap) Vitamin D3 2000 UNIT Take 6,000 Units by mouth.        Imiquimod (Cream) ALDARA 5 %         Pravastatin Sodium (Tab) PRAVACHOL 20 MG TAKE 1 TABLET ORALLY AT  BEDTIME        .                 Medicines prescribed today were sent to:     [facility]/PHARMACY #[id number] - [place], [place] - [address]    [address] [place] [address]    Phone: [phone] Fax: [phone]    Open 24 Hours?: No      Medication refill instructions:       If your prescription bottle indicates you have medication refills left, it is not necessary to call your provider’s office. Please contact your pharmacy and they will refill your medication.    If your prescription bottle indicates you do not have any refills left, you may request refills at any time through one of the following ways: The online [employer] system (except Urgent Care), by calling your provider’s office, or by asking your pharmacy to contact your provider’s office with a refill request. Medication refills are processed only during regular business hours and may not be available until the next business day. Your provider may request additional information or to have a follow-up visit with you prior to refilling your medication.   *Please Note: Medication refills are assigned a new Rx number when refilled electronically. Your pharmacy may indicate that no refills were authorized even though a new prescription for the same medication is available at the pharmacy. Please request the medicine by name with the pharmacy before contacting your provider for a refill.        Your To Do List     Future Labs/Procedures Complete By Expires    CBC WITH DIFFERENTIAL  As directed 5/10/2018    COMP METABOLIC PANEL  As directed 5/10/2018    LIPID PROFILE  As directed 5/10/2018    VITAMIN D,25 HYDROXY  As directed 5/10/2018      Other Notes About Your Plan                  [employer] Access Code: Activation code not generated  Current [employer] Status: Active

## 2017-05-10 PROBLEM — D49.2 NEOPLASM OF UNSPECIFIED BEHAVIOR OF BONE, SOFT TISSUE, AND SKIN: Status: RESOLVED | Noted: 2017-04-26 | Resolved: 2017-05-10

## 2017-05-10 NOTE — ASSESSMENT & PLAN NOTE
Patient is taking pravastatin 20 mg every evening. She stated that she eats very healthy diet with no fiber. She walks 3 miles every day. She denies side effects from taking pravastatin. She also takes baby aspirin daily. I review her last CBC, CMP, lipid panel and recent vitamin D and thyroid function test with her.    Results for [place] (MRN [id number]) as of 5/9/2017 19:08   Ref. Range 7/12/2016 06:37   Cholesterol,Tot Latest Ref Range: 100-199 mg/dL 171   Triglycerides Latest Ref Range: 0-149 mg/dL 102   HDL Latest Ref Range: >=40 mg/dL 41   LDL Latest Ref Range: <100 mg/dL 110 (H)     Results for [place] (MRN [id number]) as of 5/9/2017 19:08   Ref. Range 1/9/2017 11:55   25-Hydroxy   Vitamin D 25 Latest Ref Range:  ng/mL 61   TSH Latest Ref Range: 0.300-3.700 uIU/mL 2.690

## 2017-05-10 NOTE — ASSESSMENT & PLAN NOTE
Patient had abnormal mammogram on the right breast which was reevaluated by diagnostic mammogram and ultrasound on the right breast on 3/7/17. She was found to have irregularity on the right breast can be due to artifact, but she was found to have cysts or fibroadenoma or prominent fat lobule in 12:30 position incidentally. Radiologist recommended to repeat ultrasound right breast in 6 months. She denied breast pain or palpable lump on her breast. She has follow-up ultrasound order and she also has scheduled ultrasound in 6 months already.

## 2017-05-10 NOTE — PROGRESS NOTES
[place] is a 71 y.o. female here to establish care and the evaluation and management of:      HPI:    Abnormal mammogram of right breast  Patient had abnormal mammogram on the right breast which was reevaluated by diagnostic mammogram and ultrasound on the right breast on 3/7/17. She was found to have irregularity on the right breast can be due to artifact, but she was found to have cysts or fibroadenoma or prominent fat lobule in 12:30 position incidentally. Radiologist recommended to repeat ultrasound right breast in 6 months. She denied breast pain or palpable lump on her breast. She has follow-up ultrasound order and she also has scheduled ultrasound in 6 months already.    Hyperlipidemia with target LDL less than 130  Patient is taking pravastatin 20 mg every evening. She stated that she eats very healthy diet with no fiber. She walks 3 miles every day. She denies side effects from taking pravastatin. She also takes baby aspirin daily. I review her last CBC, CMP, lipid panel and recent vitamin D and thyroid function test with her.    Results for [place] (MRN [id number]) as of 5/9/2017 19:08   Ref. Range 7/12/2016 06:37   Cholesterol,Tot Latest Ref Range: 100-199 mg/dL 171   Triglycerides Latest Ref Range: 0-149 mg/dL 102   HDL Latest Ref Range: >=40 mg/dL 41   LDL Latest Ref Range: <100 mg/dL 110 (H)     Results for [place] (MRN [id number]) as of 5/9/2017 19:08   Ref. Range 1/9/2017 11:55   25-Hydroxy   Vitamin D 25 Latest Ref Range:  ng/mL 61   TSH Latest Ref Range: 0.300-3.700 uIU/mL 2.690       Leukopenia  Patient has chronic leukopenia and intermittent neutropenia. She was evaluated by [place] hematologist in 2012 and has negative bone marrow biopsy. She was told that she did not need to do further workup on that. She is generally doing well and she denied signs and symptoms of infection or any lymphadenopathy.    Osteopenia with high risk of fracture  Last DEXA scan on 11/4/15  showed that she has osteopenia with T score -2 and lumbar spine and T score -2.1 on left femur. She is taking vitamin D 5000 units 5 times a week and she has walking, exercising regularly. Last vitamin D level was 61 on 17. She will continue finish vitamin D 5000 units and will cut down vitamin D dose to 2000 units daily.    Current medicines (including changes today)  Current Outpatient Prescriptions   Medication Sig Dispense Refill   • imiquimod (ALDARA) 5 % cream      • pravastatin (PRAVACHOL) 20 MG Tab TAKE 1 TABLET ORALLY AT BEDTIME 90 Tab 2   • Cholecalciferol (VITAMIN D3) 2000 UNIT CAPS Take 5,000 Units by mouth.     • aspirin EC (ECOTRIN) 81 MG TBEC Take 81 mg by mouth every day.         No current facility-administered medications for this visit.     She  has a past medical history of Leukopenia (3/21/2012); Hyperlipidemia LDL goal < 130 (3/21/2012); Basal cell carcinoma of skin; and Melanoma of back (CMS-HCC) (2016).  She  has past surgical history that includes tonsillectomy; primary c section; breast biopsy; bone marrow aspiration (2012); and bone marrow biopsy, ndl/trocar (2012).  Social History   Substance Use Topics   • Smoking status: Never Smoker    • Smokeless tobacco: Never Used   • Alcohol Use: 1.0 oz/week     2 Glasses of wine per week     Social History     Social History Narrative    Former . 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    Walks 5 miles per day.      Family History   Problem Relation Age of Onset   • Genetic Father      alzheimers d. 72   • Hyperlipidemia Father    • Hypertension Father    • Cancer Mother      lymphoma d. 86   • Diabetes Brother      resolved with lifestyle mod     Family Status   Relation Status Death Age   • Father  72     Alzheimers   • Mother  85     lymphoma Mycosis fungiodes   • Brother Alive    • Sister Alive    • Sister Alive    • Brother Alive    • Brother Alive    • Maternal Grandmother     • Maternal  "Grandfather     • Paternal Grandmother     • Paternal Grandfather             ROS    Gen.: Denied weight change, appetite change, fatigue.  ENT: Denied sinus tenderness, nasal congestion, runny nose, or sore throat  CVS: Denied chest pain, palpitations, legs swelling.  Respiratory: Denied cough, shortness of breath, wheezing.  GI: Denied abdominal pain, constipation or diarrhea.  Endocrine: Denied temperature intolerance, increased frequency of urination, polyphagia or polydipsia.  Musculoskeletal: Denied back pain or joint pain.    All other systems reviewed and are negative     Objective:     Blood pressure 110/64, pulse 71, temperature 37.4 °C (99.3 °F), height 1.676 m (5' 5.98\"), weight 58.514 kg (129 lb), last menstrual period 1997, SpO2 98 %, not currently breastfeeding. Body mass index is 20.83 kg/(m^2).  Physical Exam:    Constitutional: Well nourished and Well developed, Alert, no distress.  Skin: Warm, dry, good turgor, no rashes in visible areas.  Eye: Equal, round and reactive, conjunctiva clear, lids normal.  ENMT: Lips without lesions, good dentition, oropharynx clear.  Neck: Trachea midline, no masses, no thyromegaly. No cervical or supraclavicular lymphadenopathy.  Respiratory: Unlabored respiratory effort, lungs clear to auscultation, no wheezes, no ronchi.  Cardiovascular: Normal S1, S2, no murmur, no edema.   Abdomen: Soft, non distended, non-tender, no masses, no hepatosplenomegaly. Bowel sound normal.  Extremities: No edema, no clubbing, no cyanosis.  Psych: Alert and oriented x3, normal affect and mood.          Assessment and Plan:   The following treatment plan was discussed       1. Hyperlipidemia with target LDL less than 130  - Well-controlled. Continue current regimen, pravastatin 20 mg every evening and continue aspirin 81 mg daily. Recheck lab 1-2 weeks before next follow up visit.  - Advised to eat low fat, low carbohydrate and high fiber diet as well as " do cardio physical exercise regularly.   - COMP METABOLIC PANEL; Future  - LIPID PROFILE; Future    2. Leukopenia, unspecified type  - Chronic and stable. Evaluated by hematologist in 2012 with negative workup for cancer and recommended to continue to monitor only.  - CBC WITH DIFFERENTIAL; Future    3. Osteopenia with high risk of fracture  - She will continue vitamin D and calcium supplements daily and encouraged to do weightbearing exercises regularly.  - VITAMIN D,25 HYDROXY; Future    4. Abnormal mammogram of right breast  - She will repeat ultrasound in 6 months.        Records requested.  Followup: Return in about 6 months (around 11/9/2017), or if symptoms worsen or fail to improve, for leukopenia, hyperlipidemia, osteopenia, abnormal mammogram of right breast, lab review. sooner should new symptoms or problems arise.      Please note that this dictation was created using voice recognition software. I have made every reasonable attempt to correct obvious errors, but I expect that there may have unintended errors in text, spelling, punctuation, or grammar that I did not discover.

## 2017-05-10 NOTE — ASSESSMENT & PLAN NOTE
Patient has chronic leukopenia and intermittent neutropenia. She was evaluated by [place] hematologist in 2012 and has negative bone marrow biopsy. She was told that she did not need to do further workup on that. She is generally doing well and she denied signs and symptoms of infection or any lymphadenopathy.

## 2017-05-10 NOTE — ASSESSMENT & PLAN NOTE
Last DEXA scan on 11/4/15 showed that she has osteopenia with T score -2 and lumbar spine and T score -2.1 on left femur. She is taking vitamin D 5000 units 5 times a week and she has walking, exercising regularly. Last vitamin D level was 61 on 1/9/17. She will continue finish vitamin D 5000 units and will cut down vitamin D dose to 2000 units daily.

## 2017-05-16 ENCOUNTER — TELEPHONE (OUTPATIENT)
Dept: MEDICAL GROUP | Age: 72
End: 2017-05-16

## 2017-05-16 DIAGNOSIS — R41.3 MEMORY LOSS OR IMPAIRMENT: ICD-10-CM

## 2017-05-16 NOTE — TELEPHONE ENCOUNTER
Patient concerns losing her short term memory and family hx of Alzheimer dementia at age of early 60. She wants to have neurological assessment with neurologist and requested to refer to memory clinic.    Gayle Graham M.D.

## 2017-09-06 ENCOUNTER — HOSPITAL ENCOUNTER (OUTPATIENT)
Dept: RADIOLOGY | Facility: MEDICAL CENTER | Age: 72
End: 2017-09-06
Attending: FAMILY MEDICINE
Payer: MEDICARE

## 2017-09-06 DIAGNOSIS — R92.8 ABNORMAL MAMMOGRAM OF RIGHT BREAST: ICD-10-CM

## 2017-09-06 PROCEDURE — 76642 ULTRASOUND BREAST LIMITED: CPT | Mod: RT

## 2017-09-13 ENCOUNTER — OFFICE VISIT (OUTPATIENT)
Dept: NEUROLOGY | Facility: MEDICAL CENTER | Age: 72
End: 2017-09-13
Payer: MEDICARE

## 2017-09-13 VITALS
OXYGEN SATURATION: 99 % | HEART RATE: 65 BPM | BODY MASS INDEX: 21.74 KG/M2 | WEIGHT: 130.5 LBS | TEMPERATURE: 99.9 F | HEIGHT: 65 IN | DIASTOLIC BLOOD PRESSURE: 70 MMHG | SYSTOLIC BLOOD PRESSURE: 122 MMHG | RESPIRATION RATE: 16 BRPM

## 2017-09-13 DIAGNOSIS — G31.84 MCI (MILD COGNITIVE IMPAIRMENT): ICD-10-CM

## 2017-09-13 PROCEDURE — 99204 OFFICE O/P NEW MOD 45 MIN: CPT | Performed by: PSYCHIATRY & NEUROLOGY

## 2017-09-13 RX ORDER — MEMANTINE HYDROCHLORIDE 5 MG/1
5 TABLET ORAL 2 TIMES DAILY
Qty: 60 TAB | Refills: 4 | Status: SHIPPED | OUTPATIENT
Start: 2017-09-13 | End: 2018-02-03 | Stop reason: SDUPTHER

## 2017-09-13 ASSESSMENT — PATIENT HEALTH QUESTIONNAIRE - PHQ9: CLINICAL INTERPRETATION OF PHQ2 SCORE: 0

## 2017-09-13 NOTE — PROGRESS NOTES
NEUROLOGY NOTE    Referring Physician  Gayle Graham M.D.      CHIEF COMPLAINT:  Memory problems noticed by her family  Father got the dementia at the age of 60+ and passed away in 5 years  Chief Complaint   Patient presents with   • Establish Care     Memory loss       PRESENT ILLNESS:   Memory problems noticed by her family  Father got the dementia at the age of 60+ and passed away in 5 years        RED FLAGS for reversible dementia  Headache X  Fluctuation course X  Tremor X  Rapid Progressive onset ( within 2 years) V  MRI hippocampus not atrophy ?        PAST MEDICAL HISTORY:  Past Medical History:   Diagnosis Date   • Melanoma of back (CMS-HCC) 6/2016    Skin cancer/Trista Neeta APRN   • Leukopenia 3/21/2012   • Hyperlipidemia LDL goal < 130 3/21/2012   • Basal cell carcinoma of skin        PAST SURGICAL HISTORY:  Past Surgical History:   Procedure Laterality Date   • BONE MARROW ASPIRATION  11/7/2012    Performed by Johan Vuong M.D. at ENDOSCOPY [facility]   • BONE MARROW BIOPSY, NDL/TROCAR  11/7/2012    Performed by Johan Vuong M.D. at ENDOSCOPY [facility]   • BREAST BIOPSY      right breast, benign   • PRIMARY C SECTION     • TONSILLECTOMY         FAMILY HISTORY:    Father got the dementia at the age of 60+ and passed away in 5 years    Family History   Problem Relation Age of Onset   • Genetic Father      alzheimers d. 72   • Hyperlipidemia Father    • Hypertension Father    • Cancer Mother      lymphoma d. 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    Walks 5 miles per day.      ALLERGIES:  No Known Allergies  TOBHX  History   Smoking Status   • Never Smoker   Smokeless Tobacco   • Never Used     ALCHX  History   Alcohol Use   • 1.0 oz/week   • 2 Glasses of wine per week     DRUGHX  History   Drug Use No           MEDICATIONS:  Current Outpatient Prescriptions   Medication   • pravastatin (PRAVACHOL) 20 MG Tab   • Cholecalciferol (VITAMIN D3) 2000 UNIT CAPS   • aspirin EC (ECOTRIN) 81 MG TBEC   • imiquimod (ALDARA) 5 % cream     No current facility-administered medications for this visit.        REVIEW OF SYSTEM:    Constitutional: Denies fevers, Denies weight changes   Eyes: Denies changes in vision, no eye pain   Ears/Nose/Throat/Mouth: Denies nasal congestion or sore throat   Cardiovascular: Denies chest pain or palpitations   Respiratory: Denies SOB.   Gastrointestinal/Hepatic: Denies abdominal pain, nausea, vomiting, diarrhea, constipation or GI bleeding   Genitourinary: Denies bladder dysfunction, dysuria or frequency   Musculoskeletal/Rheum: Denies joint pain and swelling   Skin/Breast: Denies rash, denies breast lumps or discharge   Neurological: Denies headache, confusion, memory loss or focal weakness/parasthesias   Psychiatric: denies mood disorder   Endocrine: denies hx of diabetes or thyroid dysfunction   Heme/Oncology/Lymph Nodes: Denies enlarged lymph nodes, denies brusing or known bleeding disorder   Allergic/Immunologic: Denies hx of allergies   All other systems were reviewed and are negative (AMA/CMS criteria)       PHYSICAL AND NEUROLOGICAL EXMAINATIONS:  VITAL SIGNS: /70   Pulse 65   Temp 37.7 °C (99.9 °F)   Resp 16   Ht 1.651 m (5' 5\")   Wt 59.2 kg (130 lb 8 oz)   LMP 03/01/2004   SpO2 99%   BMI 21.72 kg/m²   CURRENT WEIGHT: BMI: Body mass index is 21.72 kg/m².  PREVIOUS WEIGHTS:  Wt Readings from " Last 25 Encounters:   09/13/17 59.2 kg (130 lb 8 oz)   05/09/17 58.5 kg (129 lb)   02/17/17 59.5 kg (131 lb 3.2 oz)   01/20/17 59 kg (130 lb)   07/15/16 57.6 kg (127 lb)   01/26/15 58.1 kg (128 lb)   06/20/14 58.1 kg (128 lb)   06/18/13 57.6 kg (127 lb)   05/17/13 58.5 kg (129 lb)   12/04/12 59.4 kg (131 lb)   11/07/12 58.1 kg (128 lb)   09/19/12 58.5 kg (129 lb)   03/21/12 57.6 kg (127 lb)   03/09/12 58.2 kg (128 lb 6.4 oz)       General appearance of patient: WDWN(+) NAD(+)    EYES  o Fundus : Papilledem(-) Exudates(-) Hemorrhage(-)  Nervous System  Orientation to time, place and person(+)  Memory normal(+)  Language: aphasia(-)  Knowledge: past(+) Current(+)  Attention(+)  Cranial Nerves  • Nerve 2: intact  • Nerve 3,4,6: intact  • Nerve 5 : intact  • Nerve 7: intact  • Nerve 8: intact  • Nerve 9 & 10: intact  • Nerve 11: intact  • Nerve 12: intact  Muscle Power and muscle tone: symmetric, normal in upper and lower  Sensory System: Pin sensation intact(+)  Reflexes: symmetric throughout  Cerebellar Function FNP normal   Gait : Steady(+) TandemGait steady(+)  Heart and Vascular  Peripheral Vasucular system : Edema (-) Swelling(-)  RHB, Breathing sound clear  abdomen bowel sound normoactive  Extremities freely moveable  Joints no contracture       NEUROIMAGING: I reviewed the MRI/CT of brain       LAB:          Mini-Mental State Examination (MMSE)    Performed by Jose Mayo M.D.09/13/17    Maximum Score Patient’s Score Questions   5 5 “What is the year? Season? Date? Day of the week? Month?”   5 5 “Where are we now: State? County? Town/city? Hospital? Floor?”   3 3 The examiner names three unrelated objects clearly and slowly, then  asks the patient to name all three of them. The patient’s response is  used for scoring. The examiner repeats them until patient learns all of  them, if possible. Number of trials: ___________   5 5 “Spell WORLD backwards.” (D-[id number])   3 3 “Earlier I told you the names of three  things. Can you tell me what those were?”   2 2 Show the patient two simple objects, such as a wristwatch and a pencil, and ask the patient to name them.   1 1 “Repeat the phrase: ‘No ifs, ands, or buts.’”   3 1 “Take the paper in your right hand, fold it in half, and put it on the floor.”(The examiner gives the patient a piece of blank paper.)   1 1 “Please read this and do what it says.” (Written instruction is “Close  your eyes.”)   1 1 “Make up and write a sentence about anything.” (This sentence must  contain a noun and a verb.)   1 1 “Please copy this picture.” (The examiner gives the patient a blank  piece of paper and asks him/her to draw the symbol below. All 10  angles must be present and two must intersect.)     30 30 TOTAL             IMPRESSION:    1. MCI-- mild cognitive impairment for one year-- see reference  2. Family Hx of Alzheimer    PLAN/RECOMMENDATIONS:    We discussed about the blood tests for Dementia  1. APOE apolipoprotein E   2. Pre-senile dementia genes (There are at least three dominant genes that have been identified in cases of familial Alzheimer's disease with early onset, namely the amyloid precursor gene (CANDE), and the genes encoding presenilin 1 (PSEN1) and presenilin 2 (PSEN2). )  3. Some new tests to diagnose Dementia such as CSF tau protein      Generally speaking, we do not offer these tests routinely because these tests are helpful in a limited situations        ________________________________________________________________________      We could try the FDA approved medication for memory: Nemenda and Aricept     We could offer MRI , Blood tests and/or EEG( in the future) to exclude reversible causes of memory problems    Exercise muscle and brain    Quit alcohol altogether      Reduce anxiety by praying, yoga, meditation and etc  ________________________________________________________________________      Advise the following nutritional  supplementation  ________________________________________________________________________    Fish Oil -- Omega 3 1000mg 3# daily  Try Daily Probiotic too  Vit D-3 4000 unit daily  Multiple Vitamin Daily  ________________________________________________________________________        ________________________________________________________________________    Foods that inflame    Try to avoid or limit these foods as much as possible:     refined carbohydrates, such as white bread and pastries     French fries and other fried foods     soda and other sugar-sweetened beverages     red meat (burgers, steaks) and processed meat (hot dogs, sausage)     margarine, shortening, and lard    Foods that combat inflammation     Include plenty of these anti-inflammatory foods in your diet:     tomatoes     olive oil     green leafy vegetables, such as spinach, kale, and collards     nuts like almonds and walnuts     fatty fish like salmon, mackerel, tuna, and sardines     fruits such as strawberries, blueberries, cherries, and oranges        ________________________________________________________________________    PLAN          ________________________________________________________________________    REFERENCE Mild cognitive impairment (MCI)    Mild cognitive impairment (MCI) is an intermediate stage between the expectedcognitive decline of normal aging and the more-serious decline of dementia. It can involve problems with memory, language, thinking and judgment that are greater than normal age-related changes   ________________________________________________________________________        SIGNATURE:  Jose Mayo      CC:  Gayle Graham M.D.

## 2017-09-13 NOTE — PATIENT INSTRUCTIONS
IMPRESSION:    1. MCI-- mild cognitive impairment for one year-- see reference  2. Family Hx of Alzheimer    PLAN/RECOMMENDATIONS:    We discussed about the blood tests for Dementia  1. APOE apolipoprotein E   2. Pre-senile dementia genes (There are at least three dominant genes that have been identified in cases of familial Alzheimer's disease with early onset, namely the amyloid precursor gene (CANDE), and the genes encoding presenilin 1 (PSEN1) and presenilin 2 (PSEN2). )  3. Some new tests to diagnose Dementia such as CSF tau protein      Generally speaking, we do not offer these tests routinely because these tests are helpful in a limited situations        ________________________________________________________________________      We could try the FDA approved medication for memory: Nemenda and Aricept     We could offer MRI , Blood tests and/or EEG( in the future) to exclude reversible causes of memory problems    Exercise muscle and brain    Quit alcohol altogether      Reduce anxiety by praying, yoga, meditation and etc  ________________________________________________________________________      Advise the following nutritional supplementation  ________________________________________________________________________    Fish Oil -- Omega 3 1000mg 3# daily  Try Daily Probiotic too  Vit D-3 4000 unit daily  Multiple Vitamin Daily  ________________________________________________________________________        ________________________________________________________________________    Foods that inflame    Try to avoid or limit these foods as much as possible:     refined carbohydrates, such as white bread and pastries     French fries and other fried foods     soda and other sugar-sweetened beverages     red meat (burgers, steaks) and processed meat (hot dogs, sausage)     margarine, shortening, and lard    Foods that combat inflammation     Include plenty of these anti-inflammatory foods in your  diet:     tomatoes     olive oil     green leafy vegetables, such as spinach, kale, and collards     nuts like almonds and walnuts     fatty fish like salmon, mackerel, tuna, and sardines     fruits such as strawberries, blueberries, cherries, and oranges

## 2017-09-15 ENCOUNTER — HOSPITAL ENCOUNTER (OUTPATIENT)
Dept: RADIOLOGY | Facility: MEDICAL CENTER | Age: 72
End: 2017-09-15
Attending: PSYCHIATRY & NEUROLOGY
Payer: MEDICARE

## 2017-09-15 DIAGNOSIS — G31.84 MCI (MILD COGNITIVE IMPAIRMENT): ICD-10-CM

## 2017-09-15 PROCEDURE — 70551 MRI BRAIN STEM W/O DYE: CPT

## 2017-09-21 ENCOUNTER — HOSPITAL ENCOUNTER (OUTPATIENT)
Dept: LAB | Facility: MEDICAL CENTER | Age: 72
End: 2017-09-21
Attending: PSYCHIATRY & NEUROLOGY
Payer: MEDICARE

## 2017-09-21 DIAGNOSIS — G31.84 MCI (MILD COGNITIVE IMPAIRMENT): ICD-10-CM

## 2017-09-21 LAB
25(OH)D3 SERPL-MCNC: 46 NG/ML (ref 30–100)
CRP SERPL HS-MCNC: 0.02 MG/DL (ref 0–0.75)
ERYTHROCYTE [SEDIMENTATION RATE] IN BLOOD BY WESTERGREN METHOD: 11 MM/HOUR (ref 0–30)
THYROPEROXIDASE AB SERPL-ACNC: 1.9 IU/ML (ref 0–9)
TSH SERPL DL<=0.005 MIU/L-ACNC: 2.76 UIU/ML (ref 0.3–3.7)
VIT B12 SERPL-MCNC: 307 PG/ML (ref 211–911)

## 2017-09-21 PROCEDURE — 85652 RBC SED RATE AUTOMATED: CPT

## 2017-09-21 PROCEDURE — 86376 MICROSOMAL ANTIBODY EACH: CPT

## 2017-09-21 PROCEDURE — 82607 VITAMIN B-12: CPT

## 2017-09-21 PROCEDURE — 84443 ASSAY THYROID STIM HORMONE: CPT

## 2017-09-21 PROCEDURE — 86038 ANTINUCLEAR ANTIBODIES: CPT

## 2017-09-21 PROCEDURE — 86140 C-REACTIVE PROTEIN: CPT

## 2017-09-21 PROCEDURE — 36415 COLL VENOUS BLD VENIPUNCTURE: CPT

## 2017-09-21 PROCEDURE — 82306 VITAMIN D 25 HYDROXY: CPT

## 2017-09-21 PROCEDURE — 86235 NUCLEAR ANTIGEN ANTIBODY: CPT

## 2017-09-21 PROCEDURE — 86225 DNA ANTIBODY NATIVE: CPT

## 2017-09-23 LAB — NUCLEAR IGG SER QL IA: NORMAL

## 2017-10-30 ENCOUNTER — APPOINTMENT (RX ONLY)
Dept: URBAN - METROPOLITAN AREA CLINIC 4 | Facility: CLINIC | Age: 72
Setting detail: DERMATOLOGY
End: 2017-10-30

## 2017-10-30 DIAGNOSIS — L82.1 OTHER SEBORRHEIC KERATOSIS: ICD-10-CM

## 2017-10-30 DIAGNOSIS — D18.0 HEMANGIOMA: ICD-10-CM

## 2017-10-30 DIAGNOSIS — D22 MELANOCYTIC NEVI: ICD-10-CM

## 2017-10-30 DIAGNOSIS — L81.4 OTHER MELANIN HYPERPIGMENTATION: ICD-10-CM

## 2017-10-30 DIAGNOSIS — L57.8 OTHER SKIN CHANGES DUE TO CHRONIC EXPOSURE TO NONIONIZING RADIATION: ICD-10-CM

## 2017-10-30 DIAGNOSIS — Z85.820 PERSONAL HISTORY OF MALIGNANT MELANOMA OF SKIN: ICD-10-CM

## 2017-10-30 PROBLEM — L57.0 ACTINIC KERATOSIS: Status: ACTIVE | Noted: 2017-10-30

## 2017-10-30 PROBLEM — D22.5 MELANOCYTIC NEVI OF TRUNK: Status: ACTIVE | Noted: 2017-10-30

## 2017-10-30 PROBLEM — Z85.828 PERSONAL HISTORY OF OTHER MALIGNANT NEOPLASM OF SKIN: Status: ACTIVE | Noted: 2017-10-30

## 2017-10-30 PROBLEM — D22.62 MELANOCYTIC NEVI OF LEFT UPPER LIMB, INCLUDING SHOULDER: Status: ACTIVE | Noted: 2017-10-30

## 2017-10-30 PROBLEM — D18.01 HEMANGIOMA OF SKIN AND SUBCUTANEOUS TISSUE: Status: ACTIVE | Noted: 2017-10-30

## 2017-10-30 PROBLEM — D22.71 MELANOCYTIC NEVI OF RIGHT LOWER LIMB, INCLUDING HIP: Status: ACTIVE | Noted: 2017-10-30

## 2017-10-30 PROBLEM — D22.61 MELANOCYTIC NEVI OF RIGHT UPPER LIMB, INCLUDING SHOULDER: Status: ACTIVE | Noted: 2017-10-30

## 2017-10-30 PROBLEM — D22.72 MELANOCYTIC NEVI OF LEFT LOWER LIMB, INCLUDING HIP: Status: ACTIVE | Noted: 2017-10-30

## 2017-10-30 PROCEDURE — 99213 OFFICE O/P EST LOW 20 MIN: CPT

## 2017-10-30 PROCEDURE — ? COUNSELING

## 2017-10-30 ASSESSMENT — LOCATION SIMPLE DESCRIPTION DERM
LOCATION SIMPLE: LEFT POSTERIOR THIGH
LOCATION SIMPLE: RIGHT POPLITEAL SKIN
LOCATION SIMPLE: BACK
LOCATION SIMPLE: LEFT HAND
LOCATION SIMPLE: RIGHT FOREARM
LOCATION SIMPLE: LEFT UPPER EXTREMITY
LOCATION SIMPLE: RIGHT CHEEK
LOCATION SIMPLE: LEFT CHEEK
LOCATION SIMPLE: LEFT FOREARM
LOCATION SIMPLE: RIGHT HAND
LOCATION SIMPLE: RIGHT ELBOW
LOCATION SIMPLE: LEFT UPPER BACK
LOCATION SIMPLE: RIGHT LOWER EXTREMITY
LOCATION SIMPLE: RIGHT UPPER EXTREMITY
LOCATION SIMPLE: LEFT LOWER EXTREMITY

## 2017-10-30 ASSESSMENT — LOCATION DETAILED DESCRIPTION DERM
LOCATION DETAILED: LEFT DORSAL HAND
LOCATION DETAILED: RIGHT PROXIMAL DORSAL FOREARM
LOCATION DETAILED: RIGHT ELBOW
LOCATION DETAILED: LEFT DISTAL DORSAL FOREARM
LOCATION DETAILED: LEFT ANTERIOR THIGH
LOCATION DETAILED: RIGHT CHEEK
LOCATION DETAILED: LEFT CHEEK
LOCATION DETAILED: LEFT DISTAL POSTERIOR THIGH
LOCATION DETAILED: RIGHT POPLITEAL SKIN
LOCATION DETAILED: LEFT SUPERIOR UPPER BACK
LOCATION DETAILED: RIGHT DORSAL FOREARM
LOCATION DETAILED: RIGHT UPPER BACK
LOCATION DETAILED: LEFT UPPER BACK
LOCATION DETAILED: RIGHT ANTERIOR THIGH
LOCATION DETAILED: LEFT DORSAL FOREARM
LOCATION DETAILED: RIGHT DORSAL HAND
LOCATION DETAILED: LEFT PROXIMAL DORSAL FOREARM
LOCATION DETAILED: LEFT MID BACK

## 2017-10-30 ASSESSMENT — LOCATION ZONE DERM
LOCATION ZONE: LEG
LOCATION ZONE: TRUNK
LOCATION ZONE: FACE
LOCATION ZONE: HAND
LOCATION ZONE: ARM

## 2017-11-17 ENCOUNTER — PATIENT OUTREACH (OUTPATIENT)
Dept: HEALTH INFORMATION MANAGEMENT | Facility: OTHER | Age: 72
End: 2017-11-17

## 2017-11-17 ENCOUNTER — TELEPHONE (OUTPATIENT)
Dept: MEDICAL GROUP | Age: 72
End: 2017-11-17

## 2017-11-17 DIAGNOSIS — M85.80 OSTEOPENIA WITH HIGH RISK OF FRACTURE: ICD-10-CM

## 2017-11-17 DIAGNOSIS — Z78.0 POSTMENOPAUSAL ESTROGEN DEFICIENCY: ICD-10-CM

## 2017-11-17 NOTE — PROGRESS NOTES
WebIZ Checked & Epic Updated: Yes  · WebIZ Recommendations: Patient is up to date on all vaccines  · Is patient due for Tdap? NO  · Is patient due for Shingles? NO  [facility] Verified: yes  Verify PCP: yes     Review Care Team: yes    Annual Wellness Visit Scheduling  1. Scheduling Status:Scheduled     Care Gap Scheduling (Attempt to Schedule EACH Overdue Care Gap!)     Health Maintenance Due   Topic Date Due   • Annual Wellness Visit  07/16/2017   • BONE DENSITY  11/04/2017        Scheduled patient for Annual Wellness Visit and Dexa Scan       MyChart Activation: already active

## 2017-11-27 ENCOUNTER — HOSPITAL ENCOUNTER (OUTPATIENT)
Dept: LAB | Facility: MEDICAL CENTER | Age: 72
End: 2017-11-27
Attending: INTERNAL MEDICINE
Payer: MEDICARE

## 2017-11-27 DIAGNOSIS — M85.80 OSTEOPENIA WITH HIGH RISK OF FRACTURE: ICD-10-CM

## 2017-11-27 DIAGNOSIS — D72.819 LEUKOPENIA, UNSPECIFIED TYPE: ICD-10-CM

## 2017-11-27 DIAGNOSIS — E78.5 HYPERLIPIDEMIA WITH TARGET LDL LESS THAN 130: ICD-10-CM

## 2017-11-27 LAB
25(OH)D3 SERPL-MCNC: 51 NG/ML (ref 30–100)
ALBUMIN SERPL BCP-MCNC: 4.1 G/DL (ref 3.2–4.9)
ALBUMIN/GLOB SERPL: 1.7 G/DL
ALP SERPL-CCNC: 66 U/L (ref 30–99)
ALT SERPL-CCNC: 17 U/L (ref 2–50)
ANION GAP SERPL CALC-SCNC: 6 MMOL/L (ref 0–11.9)
AST SERPL-CCNC: 25 U/L (ref 12–45)
BASOPHILS # BLD AUTO: 1.3 % (ref 0–1.8)
BASOPHILS # BLD: 0.04 K/UL (ref 0–0.12)
BILIRUB SERPL-MCNC: 0.8 MG/DL (ref 0.1–1.5)
BUN SERPL-MCNC: 16 MG/DL (ref 8–22)
CALCIUM SERPL-MCNC: 9.3 MG/DL (ref 8.5–10.5)
CHLORIDE SERPL-SCNC: 106 MMOL/L (ref 96–112)
CHOLEST SERPL-MCNC: 160 MG/DL (ref 100–199)
CO2 SERPL-SCNC: 28 MMOL/L (ref 20–33)
CREAT SERPL-MCNC: 0.83 MG/DL (ref 0.5–1.4)
EOSINOPHIL # BLD AUTO: 0.06 K/UL (ref 0–0.51)
EOSINOPHIL NFR BLD: 2 % (ref 0–6.9)
ERYTHROCYTE [DISTWIDTH] IN BLOOD BY AUTOMATED COUNT: 45.7 FL (ref 35.9–50)
GFR SERPL CREATININE-BSD FRML MDRD: >60 ML/MIN/1.73 M 2
GLOBULIN SER CALC-MCNC: 2.4 G/DL (ref 1.9–3.5)
GLUCOSE SERPL-MCNC: 95 MG/DL (ref 65–99)
HCT VFR BLD AUTO: 43.4 % (ref 37–47)
HDLC SERPL-MCNC: 38 MG/DL
HGB BLD-MCNC: 14.2 G/DL (ref 12–16)
IMM GRANULOCYTES # BLD AUTO: 0.01 K/UL (ref 0–0.11)
IMM GRANULOCYTES NFR BLD AUTO: 0.3 % (ref 0–0.9)
LDLC SERPL CALC-MCNC: 103 MG/DL
LYMPHOCYTES # BLD AUTO: 1.2 K/UL (ref 1–4.8)
LYMPHOCYTES NFR BLD: 39.7 % (ref 22–41)
MCH RBC QN AUTO: 32.1 PG (ref 27–33)
MCHC RBC AUTO-ENTMCNC: 32.7 G/DL (ref 33.6–35)
MCV RBC AUTO: 98 FL (ref 81.4–97.8)
MONOCYTES # BLD AUTO: 0.33 K/UL (ref 0–0.85)
MONOCYTES NFR BLD AUTO: 10.9 % (ref 0–13.4)
NEUTROPHILS # BLD AUTO: 1.38 K/UL (ref 2–7.15)
NEUTROPHILS NFR BLD: 45.8 % (ref 44–72)
NRBC # BLD AUTO: 0 K/UL
NRBC BLD AUTO-RTO: 0 /100 WBC
PLATELET # BLD AUTO: 242 K/UL (ref 164–446)
PMV BLD AUTO: 10.7 FL (ref 9–12.9)
POTASSIUM SERPL-SCNC: 3.5 MMOL/L (ref 3.6–5.5)
PROT SERPL-MCNC: 6.5 G/DL (ref 6–8.2)
RBC # BLD AUTO: 4.43 M/UL (ref 4.2–5.4)
SODIUM SERPL-SCNC: 140 MMOL/L (ref 135–145)
TRIGL SERPL-MCNC: 97 MG/DL (ref 0–149)
WBC # BLD AUTO: 3 K/UL (ref 4.8–10.8)

## 2017-11-27 PROCEDURE — 36415 COLL VENOUS BLD VENIPUNCTURE: CPT

## 2017-11-27 PROCEDURE — 80053 COMPREHEN METABOLIC PANEL: CPT

## 2017-11-27 PROCEDURE — 80061 LIPID PANEL: CPT

## 2017-11-27 PROCEDURE — 85025 COMPLETE CBC W/AUTO DIFF WBC: CPT

## 2017-11-27 PROCEDURE — 82306 VITAMIN D 25 HYDROXY: CPT

## 2017-12-02 NOTE — ASSESSMENT & PLAN NOTE
Patient has osteopenia with increased fracture risk. She is not on bisphosphonates treatment currently. She is taking vitamin D 5000 units daily and calcium supplement daily.  Recent vitamin D level was 51 on 11/27/17.

## 2017-12-02 NOTE — ASSESSMENT & PLAN NOTE
Patient had abnormal mammogram on right breast and she had follow-up ultrasound on 9/6/17. Repeated ultrasound of right breast showed recent dilution of cysts and no additional lesion or mass. Radiologist recommended to do screening mammogram annually, which will be due on 3/7/18.

## 2017-12-02 NOTE — ASSESSMENT & PLAN NOTE
Patient is taking pravastatin 20 mg every evening. She denies side effects from taking pravastatin. She reported eating healthy diet. She walks 2-3 miles every day and does yoga exercise 3 times a week. She is taking baby aspirin daily. Her liver enzymes are within normal.  I reviewed her blood tests with her in clinic.    Results for OLGA LAKE (MRN [id number]) as of 12/2/2017 11:44   Ref. Range 11/27/2017 07:04   Cholesterol,Tot Latest Ref Range: 100 - 199 mg/dL 160   Triglycerides Latest Ref Range: 0 - 149 mg/dL 97   HDL Latest Ref Range: >=40 mg/dL 38 (A)   LDL Latest Ref Range: <100 mg/dL 103 (H)

## 2017-12-02 NOTE — ASSESSMENT & PLAN NOTE
She has chronic leukopenia and neutropenia. Patient is asymptomatic. She was seen by outside hematologist in 2012 and has normal bone marrow biopsy study. She does not have any signs or symptoms of infection or lymphadenopathy.

## 2017-12-02 NOTE — ASSESSMENT & PLAN NOTE
Patient is taking memantine 5 mg daily. She denies side effects from taking it. Her symptom is stable with medication. Patient has follow-up appointment with Dr. Mayo, neurologist on 1/11/18.

## 2017-12-04 ENCOUNTER — OFFICE VISIT (OUTPATIENT)
Dept: MEDICAL GROUP | Age: 72
End: 2017-12-04
Payer: MEDICARE

## 2017-12-04 VITALS
HEART RATE: 79 BPM | WEIGHT: 126 LBS | HEIGHT: 65 IN | SYSTOLIC BLOOD PRESSURE: 122 MMHG | DIASTOLIC BLOOD PRESSURE: 78 MMHG | OXYGEN SATURATION: 99 % | BODY MASS INDEX: 20.99 KG/M2 | TEMPERATURE: 97.3 F

## 2017-12-04 DIAGNOSIS — R92.8 ABNORMAL MAMMOGRAM OF RIGHT BREAST: ICD-10-CM

## 2017-12-04 DIAGNOSIS — E78.5 HYPERLIPIDEMIA WITH TARGET LDL LESS THAN 130: ICD-10-CM

## 2017-12-04 DIAGNOSIS — M85.80 OSTEOPENIA WITH HIGH RISK OF FRACTURE: ICD-10-CM

## 2017-12-04 DIAGNOSIS — G31.84 MCI (MILD COGNITIVE IMPAIRMENT): ICD-10-CM

## 2017-12-04 DIAGNOSIS — D72.819 LEUKOPENIA, UNSPECIFIED TYPE: ICD-10-CM

## 2017-12-04 PROCEDURE — 99214 OFFICE O/P EST MOD 30 MIN: CPT | Performed by: INTERNAL MEDICINE

## 2017-12-04 NOTE — PROGRESS NOTES
Subjective:   Olga Lake is a 72 y.o. female here today for evaluation and management of:      Osteopenia with high risk of fracture  Patient has osteopenia with increased fracture risk. She is not on bisphosphonates treatment currently. She is taking vitamin D 5000 units daily and calcium supplement daily.  Recent vitamin D level was 51 on 11/27/17.    MCI (mild cognitive impairment)  Patient is taking memantine 5 mg daily. She denies side effects from taking it. Her symptom is stable with medication. Patient has follow-up appointment with Dr. Mayo, neurologist on 1/11/18.    Leukopenia  She has chronic leukopenia and neutropenia. Patient is asymptomatic. She was seen by outside hematologist in 2012 and has normal bone marrow biopsy study. She does not have any signs or symptoms of infection or lymphadenopathy.    Hyperlipidemia with target LDL less than 130  Patient is taking pravastatin 20 mg every evening. She denies side effects from taking pravastatin. She reported eating healthy diet. She walks 2-3 miles every day and does yoga exercise 3 times a week. She is taking baby aspirin daily. Her liver enzymes are within normal.  I reviewed her blood tests with her in clinic.    Results for OLGA LAKE (MRN [id number]) as of 12/2/2017 11:44   Ref. Range 11/27/2017 07:04   Cholesterol,Tot Latest Ref Range: 100 - 199 mg/dL 160   Triglycerides Latest Ref Range: 0 - 149 mg/dL 97   HDL Latest Ref Range: >=40 mg/dL 38 (A)   LDL Latest Ref Range: <100 mg/dL 103 (H)       Abnormal mammogram of right breast  Patient had abnormal mammogram on right breast and she had follow-up ultrasound on 9/6/17. Repeated ultrasound of right breast showed recent dilution of cysts and no additional lesion or mass. Radiologist recommended to do screening mammogram annually, which will be due on 3/7/18.         Current medicines (including changes today)  Current Outpatient Prescriptions   Medication Sig Dispense Refill   • memantine  "(NAMENDA) 5 MG Tab Take 1 Tab by mouth 2 times a day. 60 Tab 4   • imiquimod (ALDARA) 5 % cream      • pravastatin (PRAVACHOL) 20 MG Tab TAKE 1 TABLET ORALLY AT BEDTIME 90 Tab 2   • Cholecalciferol (VITAMIN D3) 2000 UNIT CAPS Take 5,000 Units by mouth.     • aspirin EC (ECOTRIN) 81 MG TBEC Take 81 mg by mouth every day.         No current facility-administered medications for this visit.      She  has a past medical history of Basal cell carcinoma of skin; Hyperlipidemia LDL goal < 130 (3/21/2012); Leukopenia (3/21/2012); and Melanoma of back (CMS-HCC) (6/2016).    ROS   No chest pain, no shortness of breath, no abdominal pain       Objective:     Blood pressure 122/78, pulse 79, temperature 36.3 °C (97.3 °F), height 1.651 m (5' 5\"), weight 57.2 kg (126 lb), last menstrual period 03/01/2004, SpO2 99 %. Body mass index is 20.97 kg/m².   Physical Exam:  General: Alert, oriented and no acute distress.  Eye contact is good, speech goal directed, affect calm  HEENT: conjunctiva non-injected, sclera non-icteric.  Oral mucous membranes pink and moist with no lesions.  Pinna normal.   Lungs: Normal respiratory effort, clear to auscultation bilaterally with good excursion.  CV: regular rate and rhythm. No murmurs.  Abdomen: soft, non distended, nontender, Bowel sound normal.  Ext: no edema, color normal, vascularity normal, temperature normal      Assessment and Plan:   The following treatment plan was discussed     1. Osteopenia with high risk of fracture  - We will continue vitamin D and calcium supplements daily. Encouraged to do weightbearing exercises regularly.  - Patient has DEXA scan scheduled in this month.   - VITAMIN D,25 HYDROXY; Future    2. MCI (mild cognitive impairment)  - Well-controlled. Continue current regimens. Recheck lab 1-2 weeks before next follow up visit.    3. Leukopenia, unspecified type  - Chronic and stable, asymptomatic. Continue to monitor.    4. Hyperlipidemia with target LDL less than " 130  - Well-controlled. Continue current regimens. Recheck lab 1-2 weeks before next follow up visit.  - Advised to eat low fat, low carbohydrate and high fiber diet as well as do cardio physical exercise regularly.   - COMP METABOLIC PANEL; Future  - LIPID PROFILE; Future    5. Abnormal mammogram of right breast  - Repeated ultrasound was negative. Patient would have annual screening mammogram on 3/7/18.      Followup: Return in about 6 months (around 6/4/2018), or if symptoms worsen or fail to improve, for hyperlipidemia, osteopenia, mild cognitive impairment, lab review.      Please note that this dictation was created using voice recognition software. I have made every reasonable attempt to correct obvious errors, but I expect that there may have unintended errors in text, spelling, punctuation, or grammar that I did not discover.

## 2017-12-12 ENCOUNTER — HOSPITAL ENCOUNTER (OUTPATIENT)
Dept: RADIOLOGY | Facility: MEDICAL CENTER | Age: 72
End: 2017-12-12
Attending: INTERNAL MEDICINE
Payer: MEDICARE

## 2017-12-12 DIAGNOSIS — M85.80 OSTEOPENIA WITH HIGH RISK OF FRACTURE: ICD-10-CM

## 2017-12-12 DIAGNOSIS — Z78.0 POSTMENOPAUSAL ESTROGEN DEFICIENCY: ICD-10-CM

## 2017-12-12 PROCEDURE — 77080 DXA BONE DENSITY AXIAL: CPT

## 2017-12-26 ENCOUNTER — TELEPHONE (OUTPATIENT)
Dept: MEDICAL GROUP | Age: 72
End: 2017-12-26

## 2017-12-26 RX ORDER — CHLORAL HYDRATE 500 MG
1000 CAPSULE ORAL
COMMUNITY
End: 2018-11-01

## 2017-12-26 RX ORDER — UBIDECARENONE 30 MG
CAPSULE ORAL
COMMUNITY

## 2017-12-26 NOTE — TELEPHONE ENCOUNTER
ANNUAL WELLNESS VISIT PRE-VISIT PLANNING     1.  Reviewed note from last office visit with PCP: YES    2.  If any orders were placed at last visit, do we have Results/Consult Notes?        •  Labs - Labs ordered, but not to be completed until 5/17/18.   Note: If patient appointment is for lab review and patient did not complete labs, check with provider if OK to reschedule patient until labs completed.       •  Imaging - Imaging ordered, completed and results are in chart.       •  Referrals - No referrals were ordered at last office visit.    3.  Immunizations were updated in Epic using WebIZ?: Epic matches WebIZ       •  WebIZ Recommendations: Patient is up to date on all vaccines       •  Is patient due for Tdap? NO       •  Is patient due for Shingles? NO     4.  Patient is due for the following Health Maintenance Topics:   Health Maintenance Due   Topic Date Due   • Annual Wellness Visit  07/16/2017       - Patient is up-to-date on all Health Maintenance topics. No records have been requested at this time.    5.  Reviewed/Updated the following with patient:       •   Preferred Pharmacy? YES       •   Preferred Lab? YES       •   Preferred Communication? YES       •   Allergies? YES       •   Medications? YES. Was Abstract Encounter opened and chart updated? YES       •   Social History? YES. Was Abstract Encounter opened and chart updated? YES       •   Family History (document living status of immediate family members and if + hx of cancer, diabetes, hypertension, hyperlipidemia, heart attack, stroke) YES. Was Abstract Encounter opened and chart updated? YES    6.  Care Team Updated:       •   DME Company (gait device, O2, CPAP, etc.): YES       •   Other Specialists (eye doctor, derm, GYN, cardiology, endo, etc): YES    7.  Patient has the following Care Path diagnoses on Problem List:  NONE    8.  Specialty Comments was updated with diagnosis information provided by [facility]: N\A    9.  Patient was advised: “This  is a free wellness visit. The provider will screen for medical conditions to help you stay healthy. If you have other concerns to address you may be asked to discuss these at a separate visit or there may be an additional fee.”     6.  Patient was informed to arrive 15 min prior to their scheduled appointment and bring in their medication bottles.

## 2018-01-02 PROBLEM — R92.8 ABNORMAL MAMMOGRAM OF RIGHT BREAST: Status: RESOLVED | Noted: 2017-05-09 | Resolved: 2018-01-02

## 2018-01-03 ENCOUNTER — OFFICE VISIT (OUTPATIENT)
Dept: MEDICAL GROUP | Age: 73
End: 2018-01-03
Payer: MEDICARE

## 2018-01-03 VITALS
HEIGHT: 66 IN | HEART RATE: 63 BPM | OXYGEN SATURATION: 97 % | DIASTOLIC BLOOD PRESSURE: 68 MMHG | WEIGHT: 128 LBS | SYSTOLIC BLOOD PRESSURE: 134 MMHG | BODY MASS INDEX: 20.57 KG/M2 | TEMPERATURE: 98.9 F

## 2018-01-03 DIAGNOSIS — D72.819 LEUKOPENIA, UNSPECIFIED TYPE: ICD-10-CM

## 2018-01-03 DIAGNOSIS — M85.80 OSTEOPENIA WITH HIGH RISK OF FRACTURE: ICD-10-CM

## 2018-01-03 DIAGNOSIS — Z00.00 MEDICARE ANNUAL WELLNESS VISIT, SUBSEQUENT: ICD-10-CM

## 2018-01-03 DIAGNOSIS — E78.5 HYPERLIPIDEMIA WITH TARGET LDL LESS THAN 130: ICD-10-CM

## 2018-01-03 DIAGNOSIS — G31.84 MCI (MILD COGNITIVE IMPAIRMENT): ICD-10-CM

## 2018-01-03 PROCEDURE — G0439 PPPS, SUBSEQ VISIT: HCPCS | Performed by: INTERNAL MEDICINE

## 2018-01-03 RX ORDER — ALENDRONATE SODIUM 70 MG/1
70 TABLET ORAL
Qty: 12 TAB | Refills: 3 | Status: SHIPPED | OUTPATIENT
Start: 2018-01-03 | End: 2018-05-07

## 2018-01-03 ASSESSMENT — PATIENT HEALTH QUESTIONNAIRE - PHQ9: CLINICAL INTERPRETATION OF PHQ2 SCORE: 0

## 2018-01-03 ASSESSMENT — PAIN SCALES - GENERAL: PAINLEVEL: NO PAIN

## 2018-01-03 NOTE — PROGRESS NOTES
Chief Complaint   Patient presents with   • Annual Wellness Visit     SCP         HPI:  Lia is a 72 y.o. here for Medicare Annual Wellness Visit      Patient Active Problem List    Diagnosis Date Noted   • MCI (mild cognitive impairment) 09/13/2017   • H/O melanoma excision 07/15/2016   • H/O basal cell carcinoma excision 05/29/2015   • Preventative health care 12/04/2012   • Leukopenia 03/21/2012   • Hyperlipidemia with target LDL less than 130 03/21/2012   • Osteopenia with high risk of fracture 03/21/2012       Current Outpatient Prescriptions   Medication Sig Dispense Refill   • alendronate (FOSAMAX) 70 MG Tab Take 1 Tab by mouth every 7 days. 12 Tab 3   • Omega-3 Fatty Acids (FISH OIL) 1000 MG Cap capsule Take 1,000 mg by mouth 3 times a day, with meals.     • Multiple Vitamins-Minerals (MULTI FOR HER 50+) Tab Take  by mouth.     • Probiotic Product (PRO-BIOTIC BLEND) Cap Take  by mouth.     • memantine (NAMENDA) 5 MG Tab Take 1 Tab by mouth 2 times a day. 60 Tab 4   • imiquimod (ALDARA) 5 % cream      • pravastatin (PRAVACHOL) 20 MG Tab TAKE 1 TABLET ORALLY AT BEDTIME 90 Tab 2   • Cholecalciferol (VITAMIN D3) 2000 UNIT CAPS Take 5,000 Units by mouth.     • aspirin EC (ECOTRIN) 81 MG TBEC Take 81 mg by mouth every day.         No current facility-administered medications for this visit.         Patient is taking medications as noted in medication list.  Current supplements as per medication list.     Allergies: Patient has no known allergies.    Current social contact/activities: movies/go to games/Scientologist activities     Is patient current with immunizations? Yes.    She  reports that she has never smoked. She has never used smokeless tobacco. She reports that she drinks alcohol. She reports that she does not use drugs.  Counseling given: Yes        DPA/Advanced directive: Patient has Advanced Directive on file.     ROS:    Gait: Uses no assistive device   Ostomy: no   Other tubes: no   Amputations: no    Chronic oxygen use no   Last eye exam 9/17   Wears hearing aids: no   : Denies any urinary leakage during the last 6 months      Screening:        Depression Screening    Little interest or pleasure in doing things?  0 - not at all  Feeling down, depressed, or hopeless? 0 - not at all  Patient Health Questionnaire Score: 0    If depressive symptoms identified deferred to follow up visit unless specifically addressed in assessment and plan.    Interpretation of PHQ-9 Total Score   Score Severity   1-4 No Depression   5-9 Mild Depression   10-14 Moderate Depression   15-19 Moderately Severe Depression   20-27 Severe Depression    Screening for Cognitive Impairment    Three Minute Recall (apple, watch, loyda)   /3 Table leader sunset  3/3  Draw clock face with all 12 numbers set to the hand to show 10 minutes past 11 o'clock  1 5/5  If cognitive concerns identified, deferred for follow up unless specifically addressed in assessment and plan.    Fall Risk Assessment    Has the patient had two or more falls in the last year or any fall with injury in the last year?  No  If fall risk identified, deferred for follow up unless specifically addressed in assessment and plan.    Safety Assessment    Throw rugs on floor.  Yes  Handrails on all stairs.  Yes  Good lighting in all hallways.  Yes  Difficulty hearing.  No  Patient counseled about all safety risks that were identified.    Functional Assessment ADLs    Are there any barriers preventing you from cooking for yourself or meeting nutritional needs?  No.    Are there any barriers preventing you from driving safely or obtaining transportation?  No.    Are there any barriers preventing you from using a telephone or calling for help?  No.    Are there any barriers preventing you from shopping?  No.    Are there any barriers preventing you from taking care of your own finances?  No.    Are there any barriers preventing you from managing your medications?  No.    Are you  currently engaging any exercise or physical activity?  Yes.  Walk 4/5 miles weekly/yoga 3 times a week    Health Maintenance Summary                Annual Wellness Visit Overdue 7/16/2017      Done 7/15/2016 Visit Dx: Medicare annual wellness visit, subsequent     Patient has more history with this topic...    MAMMOGRAM Next Due 3/7/2018      Done 3/7/2017 MA-DIAGNOSTIC MAMMO-UNILAT     Patient has more history with this topic...    COLONOSCOPY Next Due 10/15/2019      Done 10/15/2009 REFERRAL TO GI FOR COLONOSCOPY     Patient has more history with this topic...    BONE DENSITY Next Due 12/12/2019      Done 12/12/2017 DS-BONE DENSITY STUDY (DEXA)     Patient has more history with this topic...    IMM DTaP/Tdap/Td Vaccine Next Due 2/2/2027      Done 2/2/2017 Imm Admin: Tdap Vaccine          Patient Care Team:  Gayle Graham M.D. as PCP - General (Internal Medicine)  GUALBERTO Pickett as Consulting Physician (Dermatology)  Emilio Reid as Consulting Physician (Optometry)  Jose Mayo M.D. as Consulting Physician (Neurology)    Social History   Substance Use Topics   • Smoking status: Never Smoker   • Smokeless tobacco: Never Used   • Alcohol use 0.0 oz/week     Family History   Problem Relation Age of Onset   • Genetic Father      alzheimers d. 72   • Hyperlipidemia Father    • Hypertension Father    • Cancer Mother      lymphoma d. 86   • Diabetes Brother      resolved with lifestyle mod     She  has a past medical history of Basal cell carcinoma of skin; Hyperlipidemia LDL goal < 130 (3/21/2012); Leukopenia (3/21/2012); and Melanoma of back (CMS-HCC) (6/2016).   Past Surgical History:   Procedure Laterality Date   • BONE MARROW ASPIRATION  11/7/2012    Performed by Johan Vuong M.D. at ENDOSCOPY [facility]   • BONE MARROW BIOPSY, NDL/TROCAR  11/7/2012    Performed by Johan Vuong M.D. at ENDOSCOPY [facility]   • BREAST BIOPSY      right breast, benign   • PRIMARY C SECTION    "  • TONSILLECTOMY             Exam:     Blood pressure 134/68, pulse 63, temperature 37.2 °C (98.9 °F), height 1.664 m (5' 5.5\"), weight 58.1 kg (128 lb), last menstrual period 03/01/2004, SpO2 97 %. Body mass index is 20.98 kg/m².    Hearing good.    Dentition good  Alert, oriented in no acute distress.  Eye contact is good, speech goal directed, affect calm      Assessment and Plan. The following treatment and monitoring plan is recommended:    1. Medicare annual wellness visit, subsequent  Annual Wellness Visit - Includes PPPS Subsequent ()   2. Osteopenia with high risk of fracture  alendronate (FOSAMAX) 70 MG Tab    Annual Wellness Visit - Includes PPPS Subsequent ()    Chronic. Decreased bone density on left femur and increase on lumbar spine. Continue vitamin D and calcium supplements daily. Continue weightbearing exercise to keep healthy body weight.  We discussed to try Fosamax and also reviewed the risks and benefits of Fosamax. Patient agreed to take Fosamax. Her kidney function normal , and calcium level was normal on last blood test on 11/27/17. Patient follows with dentist regularly and denied having jaw or dental infection.  Patient will see dentist in a few months. Patient does not have acid reflux or indigestion.   Advised to take Fosamax with plenty of water and to stay upright for at least 2 hour after taking medication to prevent esophagitis.   Advise patient to stop taking Fosamax if she has any side effects from taking it.    3. MCI (mild cognitive impairment)  Annual Wellness Visit - Includes PPPS Subsequent ()    Well-controlled. Continue memantine 5 mg twice a day. Reviewed potential side effects of memantine with patient.   4. Leukopenia, unspecified type  Annual Wellness Visit - Includes PPPS Subsequent ()    Chronic and stable, asymptomatic. Patient does not have any signs of infection. She was evaluated by hematologist in the past. Continue to monitor.   5. " Hyperlipidemia with target LDL less than 130  Annual Wellness Visit - Includes PPPS Subsequent ()    Well-controlled. Continue pravastatin 20 mg every evening. Advised to eat low fat, low carbohydrate and high fiber diet as well as do cardio physical exercise regularly.          Services suggested: No services needed at this time  Health Care Screening recommendations as per orders if indicated.  Referrals offered: PT/OT/Nutrition counseling/Behavioral Health/Smoking cessation as per orders if indicated.    Discussion today about general wellness and lifestyle habits:    · Prevent falls and reduce trip hazards; Cautioned about securing or removing rugs.  · Have a working fire alarm and carbon monoxide detector;   · Engage in regular physical activity and social activities       Follow-up: Return in about 4 months (around 5/7/2018), or if symptoms worsen or fail to improve, for osteopenia with high risk of fracture, leukopenia, hyperlipidemia, mild cognitive impairment, lab.

## 2018-01-11 ENCOUNTER — OFFICE VISIT (OUTPATIENT)
Dept: NEUROLOGY | Facility: MEDICAL CENTER | Age: 73
End: 2018-01-11
Payer: MEDICARE

## 2018-01-11 VITALS
BODY MASS INDEX: 21.16 KG/M2 | HEART RATE: 77 BPM | RESPIRATION RATE: 16 BRPM | HEIGHT: 65 IN | SYSTOLIC BLOOD PRESSURE: 118 MMHG | DIASTOLIC BLOOD PRESSURE: 70 MMHG | TEMPERATURE: 97.5 F | OXYGEN SATURATION: 96 % | WEIGHT: 127 LBS

## 2018-01-11 DIAGNOSIS — G31.84 MCI (MILD COGNITIVE IMPAIRMENT): ICD-10-CM

## 2018-01-11 PROCEDURE — 99213 OFFICE O/P EST LOW 20 MIN: CPT | Performed by: PSYCHIATRY & NEUROLOGY

## 2018-01-11 NOTE — PROGRESS NOTES
NEUROLOGY NOTE    Referring Physician  Gayle Graham M.D.      CHIEF COMPLAINT:  Memory problems noticed by her family  Father got the dementia at the age of 60+ and passed away in 5 years  Chief Complaint   Patient presents with   • Follow-Up     Mild cognitive impairment       PRESENT ILLNESS:   Memory problems noticed by her family  Father got the dementia at the age of 60+ and passed away in 5 years        RED FLAGS for reversible dementia  Headache X  Fluctuation course X  Tremor X  Rapid Progressive onset ( within 2 years) V  MRI hippocampus not atrophy ?        PAST MEDICAL HISTORY:  Past Medical History:   Diagnosis Date   • Basal cell carcinoma of skin    • Hyperlipidemia LDL goal < 130 3/21/2012   • Leukopenia 3/21/2012   • Melanoma of back (CMS-HCC) 6/2016    Skin cancer/Trista Neeta APRN       PAST SURGICAL HISTORY:  Past Surgical History:   Procedure Laterality Date   • BONE MARROW ASPIRATION  11/7/2012    Performed by Johan Vuong M.D. at ENDOSCOPY [facility]   • BONE MARROW BIOPSY, NDL/TROCAR  11/7/2012    Performed by Johan Vuong M.D. at ENDOSCOPY [facility]   • BREAST BIOPSY      right breast, benign   • PRIMARY C SECTION     • TONSILLECTOMY         FAMILY HISTORY:    Father got the dementia at the age of 60+ and passed away in 5 years    Family History   Problem Relation Age of Onset   • Genetic Father      alzheimers d. 72   • Hyperlipidemia Father    • Hypertension Father    • Cancer Mother      lymphoma d. 86   • Diabetes Brother      resolved with lifestyle mod       SOCIAL HISTORY:  Social History     Social History   • Marital status:      Spouse name: N/A   • Number of children: 3   • Years of education: N/A     Occupational History   •  [employer]     Social History Main Topics   • Smoking status: Never Smoker   • Smokeless tobacco: Never Used   • Alcohol use 0.0 oz/week   • Drug use: No   • Sexual activity: Yes     Partners: Male     Other Topics  Concern   • Not on file     Social History Narrative    Former . 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    Walks 5 miles per day.      ALLERGIES:  No Known Allergies  TOBHX  History   Smoking Status   • Never Smoker   Smokeless Tobacco   • Never Used     ALCHX  History   Alcohol Use   • 0.0 oz/week     DRUGHX  History   Drug Use No           MEDICATIONS:  Current Outpatient Prescriptions   Medication   • Omega-3 Fatty Acids (FISH OIL) 1000 MG Cap capsule   • Multiple Vitamins-Minerals (MULTI FOR HER 50+) Tab   • Probiotic Product (PRO-BIOTIC BLEND) Cap   • memantine (NAMENDA) 5 MG Tab   • pravastatin (PRAVACHOL) 20 MG Tab   • Cholecalciferol (VITAMIN D3) 2000 UNIT CAPS   • aspirin EC (ECOTRIN) 81 MG TBEC   • alendronate (FOSAMAX) 70 MG Tab   • imiquimod (ALDARA) 5 % cream     No current facility-administered medications for this visit.        REVIEW OF SYSTEM:    Constitutional: Denies fevers, Denies weight changes   Eyes: Denies changes in vision, no eye pain   Ears/Nose/Throat/Mouth: Denies nasal congestion or sore throat   Cardiovascular: Denies chest pain or palpitations   Respiratory: Denies SOB.   Gastrointestinal/Hepatic: Denies abdominal pain, nausea, vomiting, diarrhea, constipation or GI bleeding   Genitourinary: Denies bladder dysfunction, dysuria or frequency   Musculoskeletal/Rheum: Denies joint pain and swelling   Skin/Breast: Denies rash, denies breast lumps or discharge   Neurological: Denies headache, confusion, memory loss or focal weakness/parasthesias   Psychiatric: denies mood disorder   Endocrine: denies hx of diabetes or thyroid dysfunction   Heme/Oncology/Lymph Nodes: Denies enlarged lymph nodes, denies brusing or known bleeding disorder   Allergic/Immunologic: Denies hx of allergies   All other systems were reviewed and are negative (AMA/CMS criteria)       PHYSICAL AND NEUROLOGICAL EXMAINATIONS:  VITAL SIGNS: /70   Pulse 77   Temp 36.4 °C (97.5 °F)   Resp 16   Ht 1.651 m (5'  "5\")   Wt 57.6 kg (127 lb)   LMP 03/01/2004   SpO2 96%   BMI 21.13 kg/m²   CURRENT WEIGHT: BMI: Body mass index is 21.13 kg/m².  PREVIOUS WEIGHTS:  Wt Readings from Last 25 Encounters:   01/11/18 57.6 kg (127 lb)   01/03/18 58.1 kg (128 lb)   12/04/17 57.2 kg (126 lb)   09/13/17 59.2 kg (130 lb 8 oz)   05/09/17 58.5 kg (129 lb)   02/17/17 59.5 kg (131 lb 3.2 oz)   01/20/17 59 kg (130 lb)   07/15/16 57.6 kg (127 lb)   01/26/15 58.1 kg (128 lb)   06/20/14 58.1 kg (128 lb)   06/18/13 57.6 kg (127 lb)   05/17/13 58.5 kg (129 lb)   12/04/12 59.4 kg (131 lb)   11/07/12 58.1 kg (128 lb)   09/19/12 58.5 kg (129 lb)   03/21/12 57.6 kg (127 lb)   03/09/12 58.2 kg (128 lb 6.4 oz)       General appearance of patient: WDWN(+) NAD(+)    EYES  o Fundus : Papilledem(-) Exudates(-) Hemorrhage(-)  Nervous System  Orientation to time, place and person(+)  Memory normal(+)  Language: aphasia(-)  Knowledge: past(+) Current(+)  Attention(+)  Cranial Nerves  • Nerve 2: intact  • Nerve 3,4,6: intact  • Nerve 5 : intact  • Nerve 7: intact  • Nerve 8: intact  • Nerve 9 & 10: intact  • Nerve 11: intact  • Nerve 12: intact  Muscle Power and muscle tone: symmetric, normal in upper and lower  Sensory System: Pin sensation intact(+)  Reflexes: symmetric throughout  Cerebellar Function FNP normal   Gait : Steady(+) TandemGait steady(+)  Heart and Vascular  Peripheral Vasucular system : Edema (-) Swelling(-)  RHB, Breathing sound clear  abdomen bowel sound normoactive  Extremities freely moveable  Joints no contracture       NEUROIMAGING: I reviewed the MRI/CT of brain       LAB:          Mini-Mental State Examination (MMSE)    Performed by Jose Mayo M.D.09/13/17    Maximum Score Patient’s Score Questions   5 5 “What is the year? Season? Date? Day of the week? Month?”   5 5 “Where are we now: State? County? Town/city? Hospital? Floor?”   3 3 The examiner names three unrelated objects clearly and slowly, then  asks the patient to name all " three of them. The patient’s response is  used for scoring. The examiner repeats them until patient learns all of  them, if possible. Number of trials: ___________   5 5 “Spell WORLD backwards.” (D-L-R-O-W)   3 3 “Earlier I told you the names of three things. Can you tell me what those were?”   2 2 Show the patient two simple objects, such as a wristwatch and a pencil, and ask the patient to name them.   1 1 “Repeat the phrase: ‘No ifs, ands, or buts.’”   3 1 “Take the paper in your right hand, fold it in half, and put it on the floor.”(The examiner gives the patient a piece of blank paper.)   1 1 “Please read this and do what it says.” (Written instruction is “Close  your eyes.”)   1 1 “Make up and write a sentence about anything.” (This sentence must  contain a noun and a verb.)   1 1 “Please copy this picture.” (The examiner gives the patient a blank  piece of paper and asks him/her to draw the symbol below. All 10  angles must be present and two must intersect.)     30 30 TOTAL             IMPRESSION:    1. MCI-- mild cognitive impairment for one year-- see reference  2. Family Hx of Alzheimer    PLAN/RECOMMENDATIONS:    We discussed about the blood tests for Dementia  1. APOE apolipoprotein E   2. Pre-senile dementia genes (There are at least three dominant genes that have been identified in cases of familial Alzheimer's disease with early onset, namely the amyloid precursor gene (CANDE), and the genes encoding presenilin 1 (PSEN1) and presenilin 2 (PSEN2). )  3. Some new tests to diagnose Dementia such as CSF tau protein      Generally speaking, we do not offer these tests routinely because these tests are helpful in a limited situations        ________________________________________________________________________      We could try the FDA approved medication for memory: Nemenda and Aricept     We could offer MRI , Blood tests and/or EEG( in the future) to exclude reversible causes of memory  problems    Exercise muscle and brain    Quit alcohol altogether      Reduce anxiety by praying, yoga, meditation and etc  ________________________________________________________________________      Advise the following nutritional supplementation  ________________________________________________________________________    Fish Oil -- Omega 3 1000mg 3# daily  Try Daily Probiotic too  Vit D-3 4000 unit daily  Multiple Vitamin Daily  ________________________________________________________________________        ________________________________________________________________________    Foods that inflame    Try to avoid or limit these foods as much as possible:     refined carbohydrates, such as white bread and pastries     French fries and other fried foods     soda and other sugar-sweetened beverages     red meat (burgers, steaks) and processed meat (hot dogs, sausage)     margarine, shortening, and lard    Foods that combat inflammation     Include plenty of these anti-inflammatory foods in your diet:     tomatoes     olive oil     green leafy vegetables, such as spinach, kale, and collards     nuts like almonds and walnuts     fatty fish like salmon, mackerel, tuna, and sardines     fruits such as strawberries, blueberries, cherries, and oranges        ________________________________________________________________________    PLAN          ________________________________________________________________________    REFERENCE Mild cognitive impairment (MCI)    Mild cognitive impairment (MCI) is an intermediate stage between the expectedcognitive decline of normal aging and the more-serious decline of dementia. It can involve problems with memory, language, thinking and judgment that are greater than normal age-related changes   ________________________________________________________________________        SIGNATURE:  Jose Mayo      CC:  Gayle Graham M.D.

## 2018-01-29 ENCOUNTER — APPOINTMENT (RX ONLY)
Dept: URBAN - METROPOLITAN AREA CLINIC 4 | Facility: CLINIC | Age: 73
Setting detail: DERMATOLOGY
End: 2018-01-29

## 2018-01-29 DIAGNOSIS — D18.0 HEMANGIOMA: ICD-10-CM

## 2018-01-29 DIAGNOSIS — Z86.006 PERSONAL HISTORY OF MELANOMA IN-SITU: ICD-10-CM

## 2018-01-29 DIAGNOSIS — L81.4 OTHER MELANIN HYPERPIGMENTATION: ICD-10-CM

## 2018-01-29 DIAGNOSIS — D22 MELANOCYTIC NEVI: ICD-10-CM

## 2018-01-29 DIAGNOSIS — L82.1 OTHER SEBORRHEIC KERATOSIS: ICD-10-CM

## 2018-01-29 PROBLEM — D22.61 MELANOCYTIC NEVI OF RIGHT UPPER LIMB, INCLUDING SHOULDER: Status: ACTIVE | Noted: 2018-01-29

## 2018-01-29 PROBLEM — D22.72 MELANOCYTIC NEVI OF LEFT LOWER LIMB, INCLUDING HIP: Status: ACTIVE | Noted: 2018-01-29

## 2018-01-29 PROBLEM — D22.62 MELANOCYTIC NEVI OF LEFT UPPER LIMB, INCLUDING SHOULDER: Status: ACTIVE | Noted: 2018-01-29

## 2018-01-29 PROBLEM — D22.71 MELANOCYTIC NEVI OF RIGHT LOWER LIMB, INCLUDING HIP: Status: ACTIVE | Noted: 2018-01-29

## 2018-01-29 PROBLEM — D18.01 HEMANGIOMA OF SKIN AND SUBCUTANEOUS TISSUE: Status: ACTIVE | Noted: 2018-01-29

## 2018-01-29 PROBLEM — Z85.820 PERSONAL HISTORY OF MALIGNANT MELANOMA OF SKIN: Status: ACTIVE | Noted: 2018-01-29

## 2018-01-29 PROBLEM — D22.5 MELANOCYTIC NEVI OF TRUNK: Status: ACTIVE | Noted: 2018-01-29

## 2018-01-29 PROCEDURE — ? COUNSELING

## 2018-01-29 PROCEDURE — ? SUNSCREEN RECOMMENDATIONS

## 2018-01-29 PROCEDURE — 99213 OFFICE O/P EST LOW 20 MIN: CPT

## 2018-01-29 ASSESSMENT — LOCATION SIMPLE DESCRIPTION DERM
LOCATION SIMPLE: LEFT POSTERIOR UPPER ARM
LOCATION SIMPLE: RIGHT POSTERIOR UPPER ARM
LOCATION SIMPLE: LEFT CHEEK
LOCATION SIMPLE: RIGHT LOWER BACK
LOCATION SIMPLE: UPPER BACK
LOCATION SIMPLE: RIGHT THIGH
LOCATION SIMPLE: LEFT FOREARM
LOCATION SIMPLE: RIGHT UPPER BACK
LOCATION SIMPLE: LEFT UPPER BACK
LOCATION SIMPLE: LEFT HAND
LOCATION SIMPLE: RIGHT HAND
LOCATION SIMPLE: RIGHT CHEEK
LOCATION SIMPLE: ABDOMEN
LOCATION SIMPLE: LEFT THIGH
LOCATION SIMPLE: LEFT ANTERIOR NECK
LOCATION SIMPLE: RIGHT FOREARM

## 2018-01-29 ASSESSMENT — LOCATION ZONE DERM
LOCATION ZONE: FACE
LOCATION ZONE: TRUNK
LOCATION ZONE: HAND
LOCATION ZONE: ARM
LOCATION ZONE: LEG
LOCATION ZONE: NECK

## 2018-01-29 ASSESSMENT — LOCATION DETAILED DESCRIPTION DERM
LOCATION DETAILED: RIGHT PROXIMAL DORSAL FOREARM
LOCATION DETAILED: SUPERIOR THORACIC SPINE
LOCATION DETAILED: LEFT CENTRAL MALAR CHEEK
LOCATION DETAILED: RIGHT RIB CAGE
LOCATION DETAILED: RIGHT RADIAL DORSAL HAND
LOCATION DETAILED: RIGHT CENTRAL MALAR CHEEK
LOCATION DETAILED: LEFT INFERIOR ANTERIOR NECK
LOCATION DETAILED: LEFT SUPERIOR MEDIAL UPPER BACK
LOCATION DETAILED: LEFT ANTERIOR PROXIMAL THIGH
LOCATION DETAILED: LEFT PROXIMAL POSTERIOR UPPER ARM
LOCATION DETAILED: LEFT PROXIMAL DORSAL FOREARM
LOCATION DETAILED: RIGHT SUPERIOR MEDIAL MIDBACK
LOCATION DETAILED: RIGHT DISTAL POSTERIOR UPPER ARM
LOCATION DETAILED: RIGHT ANTERIOR PROXIMAL THIGH
LOCATION DETAILED: RIGHT SUPERIOR MEDIAL UPPER BACK
LOCATION DETAILED: LEFT ULNAR DORSAL HAND
LOCATION DETAILED: PERIUMBILICAL SKIN

## 2018-02-06 RX ORDER — MEMANTINE HYDROCHLORIDE 5 MG/1
5 TABLET ORAL 2 TIMES DAILY
Qty: 180 TAB | Refills: 1 | Status: SHIPPED | OUTPATIENT
Start: 2018-02-06 | End: 2018-09-26 | Stop reason: SDUPTHER

## 2018-02-26 DIAGNOSIS — E78.5 HYPERLIPIDEMIA WITH TARGET LDL LESS THAN 130: ICD-10-CM

## 2018-02-26 RX ORDER — PRAVASTATIN SODIUM 20 MG
TABLET ORAL
Qty: 90 TAB | Refills: 3 | Status: SHIPPED | OUTPATIENT
Start: 2018-02-26 | End: 2018-10-24 | Stop reason: SDUPTHER

## 2018-04-24 ENCOUNTER — HOSPITAL ENCOUNTER (OUTPATIENT)
Dept: RADIOLOGY | Facility: MEDICAL CENTER | Age: 73
End: 2018-04-24
Attending: INTERNAL MEDICINE
Payer: MEDICARE

## 2018-04-24 DIAGNOSIS — Z12.31 SCREENING MAMMOGRAM, ENCOUNTER FOR: ICD-10-CM

## 2018-04-25 ENCOUNTER — HOSPITAL ENCOUNTER (OUTPATIENT)
Dept: LAB | Facility: MEDICAL CENTER | Age: 73
End: 2018-04-25
Attending: INTERNAL MEDICINE
Payer: MEDICARE

## 2018-04-25 ENCOUNTER — HOSPITAL ENCOUNTER (OUTPATIENT)
Dept: RADIOLOGY | Facility: MEDICAL CENTER | Age: 73
End: 2018-04-25
Attending: INTERNAL MEDICINE
Payer: MEDICARE

## 2018-04-25 DIAGNOSIS — M85.80 OSTEOPENIA WITH HIGH RISK OF FRACTURE: ICD-10-CM

## 2018-04-25 DIAGNOSIS — E78.5 HYPERLIPIDEMIA WITH TARGET LDL LESS THAN 130: ICD-10-CM

## 2018-04-25 LAB
25(OH)D3 SERPL-MCNC: 71 NG/ML (ref 30–100)
ALBUMIN SERPL BCP-MCNC: 4.2 G/DL (ref 3.2–4.9)
ALBUMIN/GLOB SERPL: 1.6 G/DL
ALP SERPL-CCNC: 67 U/L (ref 30–99)
ALT SERPL-CCNC: 18 U/L (ref 2–50)
ANION GAP SERPL CALC-SCNC: 6 MMOL/L (ref 0–11.9)
AST SERPL-CCNC: 27 U/L (ref 12–45)
BILIRUB SERPL-MCNC: 0.8 MG/DL (ref 0.1–1.5)
BUN SERPL-MCNC: 17 MG/DL (ref 8–22)
CALCIUM SERPL-MCNC: 9.5 MG/DL (ref 8.5–10.5)
CHLORIDE SERPL-SCNC: 104 MMOL/L (ref 96–112)
CHOLEST SERPL-MCNC: 154 MG/DL (ref 100–199)
CO2 SERPL-SCNC: 28 MMOL/L (ref 20–33)
CREAT SERPL-MCNC: 0.8 MG/DL (ref 0.5–1.4)
GLOBULIN SER CALC-MCNC: 2.6 G/DL (ref 1.9–3.5)
GLUCOSE SERPL-MCNC: 91 MG/DL (ref 65–99)
HDLC SERPL-MCNC: 42 MG/DL
LDLC SERPL CALC-MCNC: 98 MG/DL
POTASSIUM SERPL-SCNC: 3.7 MMOL/L (ref 3.6–5.5)
PROT SERPL-MCNC: 6.8 G/DL (ref 6–8.2)
SODIUM SERPL-SCNC: 138 MMOL/L (ref 135–145)
TRIGL SERPL-MCNC: 70 MG/DL (ref 0–149)

## 2018-04-25 PROCEDURE — 82306 VITAMIN D 25 HYDROXY: CPT

## 2018-04-25 PROCEDURE — 77063 BREAST TOMOSYNTHESIS BI: CPT

## 2018-04-25 PROCEDURE — 80061 LIPID PANEL: CPT

## 2018-04-25 PROCEDURE — 80053 COMPREHEN METABOLIC PANEL: CPT

## 2018-04-25 PROCEDURE — 36415 COLL VENOUS BLD VENIPUNCTURE: CPT

## 2018-04-26 ENCOUNTER — TELEPHONE (OUTPATIENT)
Dept: MEDICAL GROUP | Age: 73
End: 2018-04-26

## 2018-04-26 NOTE — TELEPHONE ENCOUNTER
----- Message from Gayle Graham M.D. sent at 4/26/2018  8:04 AM PDT -----  Please advise patient her mammo was normal but it did reveal that she has dense breasts.   Dense breasts may obscure small masses on typical mammogram studies. SonoCine is a new technique that is being used to screen for breast cancer in women with dense breasts. This study is not yet covered by insurance and is $125 out of pocket. If she is interested in having this study done, please contact us for an order. Otherwise, it is recommended that she follow up in one year for her screening mammogram.     Thanks!  Gayle Graham M.D.

## 2018-04-26 NOTE — TELEPHONE ENCOUNTER
Phone Number Called: [phone] (home)     Message: LVM for pt to return call regarding her results.    Left Message for patient to call back: yes

## 2018-04-26 NOTE — TELEPHONE ENCOUNTER
1. Caller Name: Lia Da Silva                                           Call Back Number: [phone] (home)         Patient approves a detailed voicemail message: N\A    Informed pt. Pt state she'll talked to Dr. Graham about SonLilliee on her upcoming appointment.

## 2018-05-03 ENCOUNTER — TELEPHONE (OUTPATIENT)
Dept: MEDICAL GROUP | Age: 73
End: 2018-05-03

## 2018-05-03 NOTE — TELEPHONE ENCOUNTER
Future Appointments       Provider Department Center    5/7/2018 8:00 AM Gayle Graham M.D. [facility] [address]        ESTABLISHED PATIENT PRE-VISIT PLANNING     Note: Patient will not be contacted if there is no indication to call.     1.  Reviewed notes from the last few office visits within the medical group: Yes    2.  If any orders were placed at last visit or intended to be done for this visit (i.e. 6 mos follow-up), do we have Results/Consult Notes?        •  Labs - Labs ordered, completed on 4/24/18 and results are in chart.   Note: If patient appointment is for lab review and patient did not complete labs, check with provider if OK to reschedule patient until labs completed.       •  Imaging - Imaging ordered, completed and results are in chart.       •  Referrals - Referral ordered, patient has NOT been seen.    3. Is this appointment scheduled as a Hospital Follow-Up? No    4.  Immunizations were updated in Epic using WebIZ?: Epic matches WebIZ       •  Web Iz Recommendations: Patient is up to date on all vaccines    5.  Patient is due for the following Health Maintenance Topics:   There are no preventive care reminders to display for this patient.    - Patient is up-to-date on all Health Maintenance topics. No records have been requested at this time.    6.  MDX printed for Provider? YES    7.  Patient was NOT informed to arrive 15 min prior to their scheduled appointment and bring in their medication bottles.

## 2018-05-04 ENCOUNTER — APPOINTMENT (RX ONLY)
Dept: URBAN - METROPOLITAN AREA CLINIC 4 | Facility: CLINIC | Age: 73
Setting detail: DERMATOLOGY
End: 2018-05-04

## 2018-05-04 DIAGNOSIS — L82.1 OTHER SEBORRHEIC KERATOSIS: ICD-10-CM

## 2018-05-04 DIAGNOSIS — D18.0 HEMANGIOMA: ICD-10-CM

## 2018-05-04 DIAGNOSIS — D22 MELANOCYTIC NEVI: ICD-10-CM

## 2018-05-04 DIAGNOSIS — Z85.820 PERSONAL HISTORY OF MALIGNANT MELANOMA OF SKIN: ICD-10-CM

## 2018-05-04 DIAGNOSIS — L81.4 OTHER MELANIN HYPERPIGMENTATION: ICD-10-CM

## 2018-05-04 PROBLEM — D22.5 MELANOCYTIC NEVI OF TRUNK: Status: ACTIVE | Noted: 2018-05-04

## 2018-05-04 PROBLEM — D22.62 MELANOCYTIC NEVI OF LEFT UPPER LIMB, INCLUDING SHOULDER: Status: ACTIVE | Noted: 2018-05-04

## 2018-05-04 PROBLEM — D18.01 HEMANGIOMA OF SKIN AND SUBCUTANEOUS TISSUE: Status: ACTIVE | Noted: 2018-05-04

## 2018-05-04 PROBLEM — D22.71 MELANOCYTIC NEVI OF RIGHT LOWER LIMB, INCLUDING HIP: Status: ACTIVE | Noted: 2018-05-04

## 2018-05-04 PROBLEM — E78.5 HYPERLIPIDEMIA, UNSPECIFIED: Status: ACTIVE | Noted: 2018-05-04

## 2018-05-04 PROBLEM — D22.61 MELANOCYTIC NEVI OF RIGHT UPPER LIMB, INCLUDING SHOULDER: Status: ACTIVE | Noted: 2018-05-04

## 2018-05-04 PROBLEM — D22.72 MELANOCYTIC NEVI OF LEFT LOWER LIMB, INCLUDING HIP: Status: ACTIVE | Noted: 2018-05-04

## 2018-05-04 PROCEDURE — 99214 OFFICE O/P EST MOD 30 MIN: CPT

## 2018-05-04 PROCEDURE — ? SUNSCREEN RECOMMENDATIONS

## 2018-05-04 PROCEDURE — ? COUNSELING

## 2018-05-04 ASSESSMENT — LOCATION ZONE DERM
LOCATION ZONE: ARM
LOCATION ZONE: FACE
LOCATION ZONE: TRUNK
LOCATION ZONE: NECK
LOCATION ZONE: HAND
LOCATION ZONE: LEG

## 2018-05-04 ASSESSMENT — LOCATION SIMPLE DESCRIPTION DERM
LOCATION SIMPLE: LEFT HAND
LOCATION SIMPLE: LEFT UPPER BACK
LOCATION SIMPLE: LEFT CHEEK
LOCATION SIMPLE: LEFT ANTERIOR NECK
LOCATION SIMPLE: RIGHT LOWER BACK
LOCATION SIMPLE: LEFT POSTERIOR UPPER ARM
LOCATION SIMPLE: RIGHT THIGH
LOCATION SIMPLE: RIGHT HAND
LOCATION SIMPLE: RIGHT FOREARM
LOCATION SIMPLE: LEFT FOREARM
LOCATION SIMPLE: ABDOMEN
LOCATION SIMPLE: LEFT THIGH
LOCATION SIMPLE: RIGHT CHEEK
LOCATION SIMPLE: RIGHT POSTERIOR UPPER ARM
LOCATION SIMPLE: UPPER BACK

## 2018-05-04 ASSESSMENT — LOCATION DETAILED DESCRIPTION DERM
LOCATION DETAILED: PERIUMBILICAL SKIN
LOCATION DETAILED: LEFT ULNAR DORSAL HAND
LOCATION DETAILED: RIGHT RADIAL DORSAL HAND
LOCATION DETAILED: RIGHT CENTRAL MALAR CHEEK
LOCATION DETAILED: RIGHT PROXIMAL DORSAL FOREARM
LOCATION DETAILED: LEFT SUPERIOR MEDIAL UPPER BACK
LOCATION DETAILED: RIGHT DISTAL POSTERIOR UPPER ARM
LOCATION DETAILED: LEFT CENTRAL MALAR CHEEK
LOCATION DETAILED: RIGHT ANTERIOR PROXIMAL THIGH
LOCATION DETAILED: SUPERIOR THORACIC SPINE
LOCATION DETAILED: LEFT PROXIMAL DORSAL FOREARM
LOCATION DETAILED: RIGHT SUPERIOR MEDIAL MIDBACK
LOCATION DETAILED: RIGHT RIB CAGE
LOCATION DETAILED: LEFT PROXIMAL POSTERIOR UPPER ARM
LOCATION DETAILED: LEFT ANTERIOR PROXIMAL THIGH
LOCATION DETAILED: LEFT INFERIOR ANTERIOR NECK

## 2018-05-07 ENCOUNTER — OFFICE VISIT (OUTPATIENT)
Dept: MEDICAL GROUP | Age: 73
End: 2018-05-07
Payer: MEDICARE

## 2018-05-07 VITALS
WEIGHT: 127 LBS | HEART RATE: 72 BPM | HEIGHT: 65 IN | OXYGEN SATURATION: 99 % | BODY MASS INDEX: 21.16 KG/M2 | DIASTOLIC BLOOD PRESSURE: 74 MMHG | SYSTOLIC BLOOD PRESSURE: 118 MMHG | TEMPERATURE: 97.5 F

## 2018-05-07 DIAGNOSIS — E78.5 HYPERLIPIDEMIA WITH TARGET LDL LESS THAN 130: ICD-10-CM

## 2018-05-07 DIAGNOSIS — R92.30 DENSE BREAST TISSUE ON MAMMOGRAM: ICD-10-CM

## 2018-05-07 DIAGNOSIS — G31.84 MCI (MILD COGNITIVE IMPAIRMENT): ICD-10-CM

## 2018-05-07 DIAGNOSIS — M85.80 OSTEOPENIA WITH HIGH RISK OF FRACTURE: ICD-10-CM

## 2018-05-07 PROCEDURE — 99214 OFFICE O/P EST MOD 30 MIN: CPT | Performed by: INTERNAL MEDICINE

## 2018-05-07 NOTE — ASSESSMENT & PLAN NOTE
Patient is taking memantine 5 mg twice a day. She denies side effects from taking it. Neurologist, Dr. Mayo increased to do so memantine from 5 mg daily to 5 mg twice a day.

## 2018-05-07 NOTE — PROGRESS NOTES
Subjective:   Olga Lake is a 72 y.o. female here today for evaluation and management of:      Hyperlipidemia with target LDL less than 130  Patient is taking pravastatin 20 mg every evening. She denies side effects from taking pravastatin. Her cholesterol is stable and well-controlled. She also takes fish oil and baby aspirin daily. Patient stated that she exercises 3-4 times a week. She has yoga and walking exercises regularly. Her liver enzymes are within normal.    Results for OLGA LAKE (MRN [id number]) as of 5/7/2018 08:34   Ref. Range 4/25/2018 07:07   Cholesterol,Tot Latest Ref Range: 100 - 199 mg/dL 154   Triglycerides Latest Ref Range: 0 - 149 mg/dL 70   HDL Latest Ref Range: >=40 mg/dL 42   LDL Latest Ref Range: <100 mg/dL 98       MCI (mild cognitive impairment)  Patient is taking memantine 5 mg twice a day. She denies side effects from taking it. Neurologist, Dr. Mayo increased to do so memantine from 5 mg daily to 5 mg twice a day.    Osteopenia with high risk of fracture  DEXA scan on 12/12/17 showed osteopenia with high risk fracture. Patient has improvement on lower back and decreased bone density in left femur. Patient stated that she did not start Fosamax as her dentist stated that medication is not good for her and she will has difficulty on healing if she needs to do dental procedure. So patient decided not to take Fosamax. She wants to control her bone density with vitamin D, calcium supplements and weightbearing exercise only. Her vitamin D level increased to 71 on 4/25/18. We discussed to cut down vitamin D dose from 5000 units daily to 4000 units daily.         Current medicines (including changes today)  Current Outpatient Prescriptions   Medication Sig Dispense Refill   • pravastatin (PRAVACHOL) 20 MG Tab TAKE 1 TABLET ORALLY AT BEDTIME 90 Tab 3   • memantine (NAMENDA) 5 MG Tab TAKE 1 TAB BY MOUTH 2 TIMES A DAY. 180 Tab 1   • Omega-3 Fatty Acids (FISH OIL) 1000 MG Cap capsule Take  "1,000 mg by mouth 3 times a day, with meals.     • Multiple Vitamins-Minerals (MULTI FOR HER 50+) Tab Take  by mouth.     • Probiotic Product (PRO-BIOTIC BLEND) Cap Take  by mouth.     • Cholecalciferol (VITAMIN D3) 2000 UNIT CAPS Take 4,000 Units by mouth.     • aspirin EC (ECOTRIN) 81 MG TBEC Take 81 mg by mouth every day.         No current facility-administered medications for this visit.      She  has a past medical history of Basal cell carcinoma of skin; Hyperlipidemia LDL goal < 130 (3/21/2012); Leukopenia (3/21/2012); and Melanoma of back (HCC) (6/2016).    ROS   No chest pain, no shortness of breath, no abdominal pain       Objective:     Blood pressure 118/74, pulse 72, temperature 36.4 °C (97.5 °F), height 1.651 m (5' 5\"), weight 57.6 kg (127 lb), last menstrual period 03/01/2004, SpO2 99 %, not currently breastfeeding. Body mass index is 21.13 kg/m².   Physical Exam:  General: Alert, oriented and no acute distress.  Eye contact is good, speech goal directed, affect calm  HEENT: conjunctiva non-injected, sclera non-icteric.  Oral mucous membranes pink and moist with no lesions.  Pinna normal.   Lungs: Normal respiratory effort, clear to auscultation bilaterally with good excursion.  CV: regular rate and rhythm. No murmurs.   Abdomen: soft, non distended, nontender, Bowel sound normal.  Ext: no edema, color normal, vascularity normal, temperature normal        Assessment and Plan:   The following treatment plan was discussed     1. Hyperlipidemia with target LDL less than 130  - Well-controlled. Continue current regimens. Recheck lab 1-2 weeks before next follow up visit.  - Recommend to monitor blood pressure and heart rate at home.  - COMP METABOLIC PANEL; Future  - LIPID PROFILE; Future    2. Osteopenia with high risk of fracture  - Patient declined to take Fosamax or other prescription medication. She wants to continue vitamin D and calcium supplements only.  - Patient will continue vitamin D 4000 " units daily.  - Recommend to do weightbearing exercises regularly.  - VITAMIN D,25 HYDROXY; Future    3. MCI (mild cognitive impairment)  - Stable. Continue memantine 5 mg twice a day.  - CBC WITH DIFFERENTIAL; Future  - COMP METABOLIC PANEL; Future    4. Dense breast tissue on mammogram  - Patient would like to do SonoCine for further assessment of dense breast tissue.  - US-SCREENING WHOLE BREAST BILATERAL (3D SCREENING); Future      Followup: Return in about 6 months (around 11/7/2018), or if symptoms worsen or fail to improve, for hyperlipidemia, mild cognitive impairment, osteopenia, lab review.      Please note that this dictation was created using voice recognition software. I have made every reasonable attempt to correct obvious errors, but I expect that there may have unintended errors in text, spelling, punctuation, or grammar that I did not discover.

## 2018-05-07 NOTE — ASSESSMENT & PLAN NOTE
Patient is taking pravastatin 20 mg every evening. She denies side effects from taking pravastatin. Her cholesterol is stable and well-controlled. She also takes fish oil and baby aspirin daily. Patient stated that she exercises 3-4 times a week. She has yoga and walking exercises regularly. Her liver enzymes are within normal.    Results for OLGA LAKE (MRN [id number]) as of 5/7/2018 08:34   Ref. Range 4/25/2018 07:07   Cholesterol,Tot Latest Ref Range: 100 - 199 mg/dL 154   Triglycerides Latest Ref Range: 0 - 149 mg/dL 70   HDL Latest Ref Range: >=40 mg/dL 42   LDL Latest Ref Range: <100 mg/dL 98

## 2018-05-07 NOTE — ASSESSMENT & PLAN NOTE
DEXA scan on 12/12/17 showed osteopenia with high risk fracture. Patient has improvement on lower back and decreased bone density in left femur. Patient stated that she did not start Fosamax as her dentist stated that medication is not good for her and she will has difficulty on healing if she needs to do dental procedure. So patient decided not to take Fosamax. She wants to control her bone density with vitamin D, calcium supplements and weightbearing exercise only. Her vitamin D level increased to 71 on 4/25/18. We discussed to cut down vitamin D dose from 5000 units daily to 4000 units daily.

## 2018-06-04 ENCOUNTER — HOSPITAL ENCOUNTER (OUTPATIENT)
Dept: RADIOLOGY | Facility: MEDICAL CENTER | Age: 73
End: 2018-06-04
Attending: INTERNAL MEDICINE
Payer: MEDICARE

## 2018-06-04 DIAGNOSIS — R92.30 DENSE BREAST TISSUE ON MAMMOGRAM: ICD-10-CM

## 2018-06-04 PROCEDURE — 76641 ULTRASOUND BREAST COMPLETE: CPT

## 2018-08-03 ENCOUNTER — APPOINTMENT (RX ONLY)
Dept: URBAN - METROPOLITAN AREA CLINIC 4 | Facility: CLINIC | Age: 73
Setting detail: DERMATOLOGY
End: 2018-08-03

## 2018-08-03 DIAGNOSIS — L81.4 OTHER MELANIN HYPERPIGMENTATION: ICD-10-CM

## 2018-08-03 DIAGNOSIS — L82.1 OTHER SEBORRHEIC KERATOSIS: ICD-10-CM

## 2018-08-03 DIAGNOSIS — D18.0 HEMANGIOMA: ICD-10-CM

## 2018-08-03 DIAGNOSIS — L57.0 ACTINIC KERATOSIS: ICD-10-CM

## 2018-08-03 DIAGNOSIS — D22 MELANOCYTIC NEVI: ICD-10-CM

## 2018-08-03 PROBLEM — D22.72 MELANOCYTIC NEVI OF LEFT LOWER LIMB, INCLUDING HIP: Status: ACTIVE | Noted: 2018-08-03

## 2018-08-03 PROBLEM — D48.5 NEOPLASM OF UNCERTAIN BEHAVIOR OF SKIN: Status: ACTIVE | Noted: 2018-08-03

## 2018-08-03 PROBLEM — D22.5 MELANOCYTIC NEVI OF TRUNK: Status: ACTIVE | Noted: 2018-08-03

## 2018-08-03 PROBLEM — D18.01 HEMANGIOMA OF SKIN AND SUBCUTANEOUS TISSUE: Status: ACTIVE | Noted: 2018-08-03

## 2018-08-03 PROBLEM — D22.62 MELANOCYTIC NEVI OF LEFT UPPER LIMB, INCLUDING SHOULDER: Status: ACTIVE | Noted: 2018-08-03

## 2018-08-03 PROBLEM — D22.71 MELANOCYTIC NEVI OF RIGHT LOWER LIMB, INCLUDING HIP: Status: ACTIVE | Noted: 2018-08-03

## 2018-08-03 PROBLEM — D22.61 MELANOCYTIC NEVI OF RIGHT UPPER LIMB, INCLUDING SHOULDER: Status: ACTIVE | Noted: 2018-08-03

## 2018-08-03 PROCEDURE — 17003 DESTRUCT PREMALG LES 2-14: CPT

## 2018-08-03 PROCEDURE — ? COUNSELING

## 2018-08-03 PROCEDURE — 17000 DESTRUCT PREMALG LESION: CPT

## 2018-08-03 PROCEDURE — 99213 OFFICE O/P EST LOW 20 MIN: CPT | Mod: 25

## 2018-08-03 PROCEDURE — 11100: CPT | Mod: 59

## 2018-08-03 PROCEDURE — ? LIQUID NITROGEN

## 2018-08-03 PROCEDURE — ? SUNSCREEN RECOMMENDATIONS

## 2018-08-03 PROCEDURE — ? BIOPSY BY SHAVE METHOD

## 2018-08-03 ASSESSMENT — LOCATION SIMPLE DESCRIPTION DERM
LOCATION SIMPLE: LEFT HAND
LOCATION SIMPLE: LEFT UPPER BACK
LOCATION SIMPLE: ABDOMEN
LOCATION SIMPLE: RIGHT FOREARM
LOCATION SIMPLE: RIGHT CHEEK
LOCATION SIMPLE: LEFT THIGH
LOCATION SIMPLE: RIGHT THIGH
LOCATION SIMPLE: LEFT FOREARM
LOCATION SIMPLE: ABDOMEN
LOCATION SIMPLE: LEFT ANTERIOR NECK
LOCATION SIMPLE: RIGHT FOREHEAD
LOCATION SIMPLE: LEFT CHEEK
LOCATION SIMPLE: UPPER BACK
LOCATION SIMPLE: RIGHT POSTERIOR UPPER ARM
LOCATION SIMPLE: RIGHT PRETIBIAL REGION
LOCATION SIMPLE: LEFT POSTERIOR UPPER ARM
LOCATION SIMPLE: RIGHT LOWER BACK
LOCATION SIMPLE: RIGHT HAND

## 2018-08-03 ASSESSMENT — LOCATION DETAILED DESCRIPTION DERM
LOCATION DETAILED: RIGHT SUPERIOR MEDIAL MIDBACK
LOCATION DETAILED: RIGHT PROXIMAL PRETIBIAL REGION
LOCATION DETAILED: LEFT ANTERIOR PROXIMAL THIGH
LOCATION DETAILED: LEFT PROXIMAL DORSAL FOREARM
LOCATION DETAILED: SUPERIOR THORACIC SPINE
LOCATION DETAILED: LEFT INFERIOR ANTERIOR NECK
LOCATION DETAILED: LEFT SUPERIOR LATERAL MALAR CHEEK
LOCATION DETAILED: RIGHT SUPERIOR FOREHEAD
LOCATION DETAILED: RIGHT RADIAL DORSAL HAND
LOCATION DETAILED: RIGHT DISTAL POSTERIOR UPPER ARM
LOCATION DETAILED: RIGHT PROXIMAL DORSAL FOREARM
LOCATION DETAILED: RIGHT RIB CAGE
LOCATION DETAILED: EPIGASTRIC SKIN
LOCATION DETAILED: PERIUMBILICAL SKIN
LOCATION DETAILED: RIGHT SUPERIOR NASAL CHEEK
LOCATION DETAILED: LEFT ULNAR DORSAL HAND
LOCATION DETAILED: LEFT PROXIMAL POSTERIOR UPPER ARM
LOCATION DETAILED: RIGHT ANTERIOR PROXIMAL THIGH
LOCATION DETAILED: LEFT CENTRAL MALAR CHEEK
LOCATION DETAILED: LEFT SUPERIOR MEDIAL UPPER BACK
LOCATION DETAILED: RIGHT CENTRAL MALAR CHEEK

## 2018-08-03 ASSESSMENT — LOCATION ZONE DERM
LOCATION ZONE: ARM
LOCATION ZONE: LEG
LOCATION ZONE: NECK
LOCATION ZONE: FACE
LOCATION ZONE: TRUNK
LOCATION ZONE: TRUNK
LOCATION ZONE: HAND

## 2018-08-03 NOTE — PROCEDURE: BIOPSY BY SHAVE METHOD
Anesthesia Type: 1% lidocaine with epinephrine
Notification Instructions: Patient will be notified of biopsy results. However, patient instructed to call the office if not contacted within 2 weeks.
Curettage Text: The wound bed was treated with curettage after the biopsy was performed.
Additional Anesthesia Volume In Cc (Will Not Render If 0): 0
Anesthesia Volume In Cc: 2
Hemostasis: Drysol
Type Of Destruction Used: Curettage
Post-Care Instructions: I reviewed with the patient in detail post-care instructions. Patient is to keep the biopsy site dry overnight, and then apply bacitracin twice daily until healed. Patient may apply hydrogen peroxide soaks to remove any crusting.
Biopsy Type: H and E
Destruction After The Procedure: No
Silver Nitrate Text: The wound bed was treated with silver nitrate after the biopsy was performed.
Dressing: bandage
Electrodesiccation And Curettage Text: The wound bed was treated with electrodesiccation and curettage after the biopsy was performed.
Lab Facility: 
Biopsy Method: Personna blade
Was A Bandage Applied: Yes
Lab: [id number]
Consent: Written consent was obtained and risks were reviewed including but not limited to scarring, infection, bleeding, scabbing, incomplete removal, nerve damage and allergy to anesthesia.
Billing Type: Third-Party Bill
Depth Of Biopsy: dermis
Wound Care: Bacitracin
Detail Level: Detailed
Electrodesiccation Text: The wound bed was treated with electrodesiccation after the biopsy was performed.
Cryotherapy Text: The wound bed was treated with cryotherapy after the biopsy was performed.

## 2018-08-03 NOTE — PROCEDURE: LIQUID NITROGEN
Consent: The patient's consent was obtained including but not limited to risks of crusting, scabbing, blistering, scarring, darker or lighter pigmentary change, recurrence, incomplete removal and infection.
Duration Of Freeze Thaw-Cycle (Seconds): 3
Post-Care Instructions: I reviewed with the patient in detail post-care instructions. Patient is to wear sunprotection, and avoid picking at any of the treated lesions. Pt may apply Vaseline to crusted or scabbing areas.
Detail Level: Simple
Render Post-Care Instructions In Note?: no
Number Of Freeze-Thaw Cycles: 2 freeze-thaw cycles

## 2018-08-08 ENCOUNTER — TELEPHONE (OUTPATIENT)
Dept: NEUROLOGY | Facility: MEDICAL CENTER | Age: 73
End: 2018-08-08

## 2018-08-08 NOTE — TELEPHONE ENCOUNTER
MEMANTINE HCL 5 MG TABLET  Will file in chart as: memantine (NAMENDA) 5 MG Tab  TAKE 1 TAB BY MOUTH 2 TIMES A DAY.       Disp: Not specified (Pharmacy requested 180 tablet)   Refills: 1     Class: Normal Start: 8/4/2018   Originally ordered: 10 months ago by Jose Mayo M.D.  Last refill: 5/3/2018  Refill auth called into [facility] no additional refills spoke to Soma

## 2018-09-06 ENCOUNTER — PATIENT MESSAGE (OUTPATIENT)
Dept: MEDICAL GROUP | Age: 73
End: 2018-09-06

## 2018-09-07 NOTE — TELEPHONE ENCOUNTER
Please advise patient that she needs to be seen in office before I refer out.  I need to know what kind of mass so that I can refer to the right specialist.    Gayle Graham M.D.

## 2018-09-07 NOTE — TELEPHONE ENCOUNTER
Phone Number Called: [phone] (home)       Message: Spoke with patient, she is scheduled for 9/10/18 with Dr. Graham to have mass on knee looked at      Left Message for patient to call back: N\A

## 2018-09-07 NOTE — TELEPHONE ENCOUNTER
From: Lia Da Silva  To: Gayle Graham M.D.  Sent: 9/6/2018 3:38 PM PDT  Subject: Non-Urgent Medical Question    Hello Dr. Graham,    I have a mass on the side of my left knee that is causing me discomfort. Could you please refer me to someone who could help me with this?    Lia Da Silva

## 2018-09-10 ENCOUNTER — HOSPITAL ENCOUNTER (OUTPATIENT)
Dept: RADIOLOGY | Facility: MEDICAL CENTER | Age: 73
End: 2018-09-10
Attending: INTERNAL MEDICINE
Payer: MEDICARE

## 2018-09-10 ENCOUNTER — OFFICE VISIT (OUTPATIENT)
Dept: MEDICAL GROUP | Age: 73
End: 2018-09-10
Payer: MEDICARE

## 2018-09-10 VITALS
WEIGHT: 127.6 LBS | HEART RATE: 60 BPM | HEIGHT: 65 IN | TEMPERATURE: 98.7 F | SYSTOLIC BLOOD PRESSURE: 148 MMHG | OXYGEN SATURATION: 98 % | BODY MASS INDEX: 21.26 KG/M2 | DIASTOLIC BLOOD PRESSURE: 76 MMHG

## 2018-09-10 DIAGNOSIS — R03.0 ELEVATED BLOOD PRESSURE READING: ICD-10-CM

## 2018-09-10 DIAGNOSIS — M25.562 PAIN AND SWELLING OF KNEE, LEFT: ICD-10-CM

## 2018-09-10 DIAGNOSIS — M25.462 PAIN AND SWELLING OF KNEE, LEFT: ICD-10-CM

## 2018-09-10 DIAGNOSIS — E78.5 HYPERLIPIDEMIA WITH TARGET LDL LESS THAN 130: ICD-10-CM

## 2018-09-10 DIAGNOSIS — G31.84 MCI (MILD COGNITIVE IMPAIRMENT): ICD-10-CM

## 2018-09-10 DIAGNOSIS — Z23 NEEDS FLU SHOT: ICD-10-CM

## 2018-09-10 PROCEDURE — 99214 OFFICE O/P EST MOD 30 MIN: CPT | Mod: 25 | Performed by: INTERNAL MEDICINE

## 2018-09-10 PROCEDURE — G0008 ADMIN INFLUENZA VIRUS VAC: HCPCS | Performed by: INTERNAL MEDICINE

## 2018-09-10 PROCEDURE — 90662 IIV NO PRSV INCREASED AG IM: CPT | Performed by: INTERNAL MEDICINE

## 2018-09-10 PROCEDURE — 73564 X-RAY EXAM KNEE 4 OR MORE: CPT | Mod: LT

## 2018-09-10 NOTE — ASSESSMENT & PLAN NOTE
Patient is taking pravastatin 20 mg every evening as instructed.  She tolerates medication without side effects.  Last lipid panel on 4/25/18 showed her cholesterol is stable and well controlled.  She also takes omega-3 and aspirin 81 mg daily.

## 2018-09-10 NOTE — PROGRESS NOTES
Subjective:   Lia Da Silva is a 72 y.o. female here today for evaluation and management of:      Pain and swelling of knee, left  This is a new problem.  Patient reported swelling and pain on the left side of the knee intermittently for about 3 months.  Patient reported that her pain is 5 out of 10 in severity.  Patient states that pain is trigger by walking exercise.  She noticed pain after she walked for 3-4 months.  So she cut down her walking exercise to 2 miles 3 times a week.  Patient reported that pain and swelling improved by applying ice, taking Aleve 220 mg 1 tablet daily as needed or by taking rest.  Patient reported feeling swelling on the lateral part of the left knee after walking exercise and she did not have swelling on left knee today when I examined her in clinic.  Patient declined to refer to orthopedist.  She agreed to have left knee x-ray for further workup.    Elevated blood pressure reading  Patient has slightly elevated blood pressure reading at 148/76 in clinic today.  She states that she never has high blood pressure in her life.  She checked her blood pressure very rarely at home but she never had a high blood pressure.  I recheck her blood pressure reading 15 minutes after rest and blood pressure decreased to 130/70.  Patient denied headache or chest pain or dizziness or any neurological symptoms.  Patient stated that she did not have pain currently to cause her blood pressure high.  She will closely monitor her blood pressure at home.  She is advised to return to clinic sooner if her blood pressure is above 150/90.  Patient agrees with the plan.    MCI (mild cognitive impairment)  Patient states that she is doing well from taking memantine 5 mg twice daily.  She follows with Dr. Mayo, neurologist as scheduled.  She denies side effects from taking memantine.    Hyperlipidemia with target LDL less than 130  Patient is taking pravastatin 20 mg every evening as instructed.  She tolerates  "medication without side effects.  Last lipid panel on 4/25/18 showed her cholesterol is stable and well controlled.  She also takes omega-3 and aspirin 81 mg daily.         Current medicines (including changes today)  Current Outpatient Prescriptions   Medication Sig Dispense Refill   • pravastatin (PRAVACHOL) 20 MG Tab TAKE 1 TABLET ORALLY AT BEDTIME 90 Tab 3   • memantine (NAMENDA) 5 MG Tab TAKE 1 TAB BY MOUTH 2 TIMES A DAY. 180 Tab 1   • Omega-3 Fatty Acids (FISH OIL) 1000 MG Cap capsule Take 1,000 mg by mouth 3 times a day, with meals.     • Multiple Vitamins-Minerals (MULTI FOR HER 50+) Tab Take  by mouth.     • Probiotic Product (PRO-BIOTIC BLEND) Cap Take  by mouth.     • Cholecalciferol (VITAMIN D3) 2000 UNIT CAPS Take 4,000 Units by mouth.     • aspirin EC (ECOTRIN) 81 MG TBEC Take 81 mg by mouth every day.         No current facility-administered medications for this visit.      She  has a past medical history of Basal cell carcinoma of skin; Hyperlipidemia LDL goal < 130 (3/21/2012); Leukopenia (3/21/2012); and Melanoma of back (HCC) (6/2016).    ROS   No chest pain, no shortness of breath, no abdominal pain       Objective:     Blood pressure 148/76, pulse 60, temperature 37.1 °C (98.7 °F), height 1.641 m (5' 4.6\"), weight 57.9 kg (127 lb 9.6 oz), last menstrual period 03/01/2004, SpO2 98 %, not currently breastfeeding. Body mass index is 21.5 kg/m².   Physical Exam:  General: Alert, oriented and no acute distress.  Eye contact is good, speech goal directed, affect calm  HEENT: conjunctiva non-injected, sclera non-icteric.  Oral mucous membranes pink and moist with no lesions.  Pinna normal.   Lungs: Normal respiratory effort, clear to auscultation bilaterally with good excursion.  CV: regular rate and rhythm. No murmurs.   Abdomen: soft, non distended, nontender, Bowel sound normal.  Ext: no edema, color normal, vascularity normal, temperature normal  Musculoskeletal exam: Nontender on palpation of the " left knee and no swelling noticed.      Assessment and Plan:   The following treatment plan was discussed     1. Pain and swelling of knee, left  - Patient is advised to wear a knee brace during walking exercise.  We discussed to try glucosamine supplements daily.  Patient may take Tylenol arthritis 650 mg 1 tablet twice daily as needed for knee pain.  She can also try low dose aleve to 20 mg 1 tablet twice daily as needed with meal.  Advised patient to stop taking Aleve if she has abdominal pain or blood in stool or black tarry stool or high blood pressure.  Patient understands and agrees with the plan.  - Patient declined to refer to orthopedist.  - Ordered left knee x-ray for further evaluation. Will advise for x-ray report.  - DX-KNEE COMPLETE 4+ LEFT; Future    2. Elevated blood pressure reading  - Slightly elevated blood pressure in clinic today.  Patient is advised to closely monitor her blood pressure at home.  Patient is advised to return to clinic if her blood pressure is above 150/90 consistently.  She denied chest pain, shortness of breath, dizziness or headache.  Discussed to eat healthy diet and low-sodium diet.    3. MCI (mild cognitive impairment)  - Well-controlled. Continue current regimens. Recheck lab 1-2 weeks before next follow up visit.  Patient is advised to follow with neurologist as scheduled.    4. Hyperlipidemia with target LDL less than 130  - Well-controlled. Continue current regimens, pravastatin 20 mg every evening.  Review potential side effects of pravastatin with patient. Recheck lab 1-2 weeks before next follow up visit.   - Advised to eat low fat, low carbohydrate and high fiber diet as well as do cardio physical exercise regularly.    5. Needs flu shot  - Influenza vaccine was given today after reviewing risks and benefits as well as side effects of vaccine.  - INFLUENZA VACCINE, HIGH DOSE (65+ ONLY)      Followup: Return in about 8 weeks (around 11/7/2018), or if symptoms  worsen or fail to improve, for Hyperlipidemia, Osteopenia, left knee pain and swelling, mild cognitive impairment, lab review.      Please note that this dictation was created using voice recognition software. I have made every reasonable attempt to correct obvious errors, but I expect that there may have unintended errors in text, spelling, punctuation, or grammar that I did not discover.

## 2018-09-10 NOTE — ASSESSMENT & PLAN NOTE
Patient has slightly elevated blood pressure reading at 148/76 in clinic today.  She states that she never has high blood pressure in her life.  She checked her blood pressure very rarely at home but she never had a high blood pressure.  I recheck her blood pressure reading 15 minutes after rest and blood pressure decreased to 130/70.  Patient denied headache or chest pain or dizziness or any neurological symptoms.  Patient stated that she did not have pain currently to cause her blood pressure high.  She will closely monitor her blood pressure at home.  She is advised to return to clinic sooner if her blood pressure is above 150/90.  Patient agrees with the plan.

## 2018-09-10 NOTE — ASSESSMENT & PLAN NOTE
Patient states that she is doing well from taking memantine 5 mg twice daily.  She follows with Dr. Mayo, neurologist as scheduled.  She denies side effects from taking memantine.

## 2018-09-10 NOTE — ASSESSMENT & PLAN NOTE
This is a new problem.  Patient reported swelling and pain on the left side of the knee intermittently for about 3 months.  Patient reported that her pain is 5 out of 10 in severity.  Patient states that pain is trigger by walking exercise.  She noticed pain after she walked for 3-4 months.  So she cut down her walking exercise to 2 miles 3 times a week.  Patient reported that pain and swelling improved by applying ice, taking Aleve 220 mg 1 tablet daily as needed or by taking rest.  Patient reported feeling swelling on the lateral part of the left knee after walking exercise and she did not have swelling on left knee today when I examined her in clinic.  Patient declined to refer to orthopedist.  She agreed to have left knee x-ray for further workup.

## 2018-09-24 ENCOUNTER — PATIENT MESSAGE (OUTPATIENT)
Dept: NEUROLOGY | Facility: MEDICAL CENTER | Age: 73
End: 2018-09-24

## 2018-09-26 RX ORDER — MEMANTINE HYDROCHLORIDE 5 MG/1
5 TABLET ORAL 2 TIMES DAILY
Qty: 180 TAB | Refills: 1 | Status: ON HOLD | OUTPATIENT
Start: 2018-09-26 | End: 2018-11-03 | Stop reason: SDUPTHER

## 2018-09-26 NOTE — TELEPHONE ENCOUNTER
From: Lia Da Silva  To: Jose Mayo M.D.  Sent: 9/24/2018 11:23 AM PDT  Subject: Prescription Question    Dr. Mayo,    Would you please send my future prescriptions for MEMANTINE to [facility]?    Thank you,  Lia Da Silva

## 2018-10-05 ENCOUNTER — PATIENT MESSAGE (OUTPATIENT)
Dept: MEDICAL GROUP | Age: 73
End: 2018-10-05

## 2018-10-05 DIAGNOSIS — M25.562 LEFT KNEE PAIN, UNSPECIFIED CHRONICITY: ICD-10-CM

## 2018-10-05 NOTE — PATIENT COMMUNICATION
1. Caller Name: My Chart request                                         Call Back Number: [phone] (home)         Patient approves a detailed voicemail message: yes    2. SPECIFIC Action To Be Taken: Referral pending, please sign.    3. Diagnosis/Clinical Reason for Request: Pain/Swelling left Knee    4. Specialty & Provider Name/Lab/Imaging Location: Orthopedics     5. Is appointment scheduled for requested order/referral: no    Patient was informed they will receive a return phone call from the office ONLY if there are any questions before processing their request. Advised to call back if they haven't received a call from the referral department in 5 days.

## 2018-10-05 NOTE — TELEPHONE ENCOUNTER
From: Lia Da Silva  To: Gayle Graham M.D.  Sent: 10/5/2018 8:05 AM PDT  Subject: Non-Urgent Medical Question    Dr. Graham,  Could you please refer me to an orthopedic doctor as I am still dealing with pain and swelling in my left knee?    Lia Da Silva

## 2018-10-05 NOTE — PATIENT COMMUNICATION
The referral to orthopedist was placed per patient's request due to left knee pain and swelling.    Gayle Graham M.D.

## 2018-10-19 ENCOUNTER — APPOINTMENT (OUTPATIENT)
Dept: RADIOLOGY | Facility: MEDICAL CENTER | Age: 73
End: 2018-10-19
Attending: ORTHOPAEDIC SURGERY
Payer: MEDICARE

## 2018-10-19 DIAGNOSIS — S83.282A CURRENT TEAR OF LATERAL CARTILAGE OR MENISCUS OF KNEE, LEFT, INITIAL ENCOUNTER: ICD-10-CM

## 2018-10-19 PROCEDURE — 73721 MRI JNT OF LWR EXTRE W/O DYE: CPT | Mod: LT

## 2018-10-24 ENCOUNTER — PATIENT MESSAGE (OUTPATIENT)
Dept: MEDICAL GROUP | Age: 73
End: 2018-10-24

## 2018-10-24 DIAGNOSIS — E78.5 HYPERLIPIDEMIA WITH TARGET LDL LESS THAN 130: ICD-10-CM

## 2018-10-24 RX ORDER — PRAVASTATIN SODIUM 20 MG
TABLET ORAL
Qty: 90 TAB | Refills: 3 | Status: SHIPPED | OUTPATIENT
Start: 2018-10-24 | End: 2019-04-02

## 2018-10-25 ENCOUNTER — PATIENT MESSAGE (OUTPATIENT)
Dept: MEDICAL GROUP | Age: 73
End: 2018-10-25

## 2018-10-29 ENCOUNTER — TELEPHONE (OUTPATIENT)
Dept: NEUROLOGY | Facility: MEDICAL CENTER | Age: 73
End: 2018-10-29

## 2018-10-29 NOTE — TELEPHONE ENCOUNTER
Patient called left message Pk has not received script. I called and spoke to pharmacy and they do have script on file for memantine but need to order it. I left patient message with the information and scrpit has been there since the end of September.

## 2018-10-30 ENCOUNTER — HOSPITAL ENCOUNTER (OUTPATIENT)
Dept: LAB | Facility: MEDICAL CENTER | Age: 73
End: 2018-10-30
Attending: INTERNAL MEDICINE
Payer: MEDICARE

## 2018-10-30 DIAGNOSIS — E78.5 HYPERLIPIDEMIA WITH TARGET LDL LESS THAN 130: ICD-10-CM

## 2018-10-30 DIAGNOSIS — G31.84 MCI (MILD COGNITIVE IMPAIRMENT): ICD-10-CM

## 2018-10-30 DIAGNOSIS — M85.80 OSTEOPENIA WITH HIGH RISK OF FRACTURE: ICD-10-CM

## 2018-10-30 LAB
25(OH)D3 SERPL-MCNC: 55 NG/ML (ref 30–100)
ALBUMIN SERPL BCP-MCNC: 4.3 G/DL (ref 3.2–4.9)
ALBUMIN/GLOB SERPL: 1.5 G/DL
ALP SERPL-CCNC: 73 U/L (ref 30–99)
ALT SERPL-CCNC: 26 U/L (ref 2–50)
ANION GAP SERPL CALC-SCNC: 6 MMOL/L (ref 0–11.9)
AST SERPL-CCNC: 28 U/L (ref 12–45)
BASOPHILS # BLD AUTO: 1.1 % (ref 0–1.8)
BASOPHILS # BLD: 0.03 K/UL (ref 0–0.12)
BILIRUB SERPL-MCNC: 0.7 MG/DL (ref 0.1–1.5)
BUN SERPL-MCNC: 20 MG/DL (ref 8–22)
CALCIUM SERPL-MCNC: 10 MG/DL (ref 8.5–10.5)
CHLORIDE SERPL-SCNC: 103 MMOL/L (ref 96–112)
CHOLEST SERPL-MCNC: 174 MG/DL (ref 100–199)
CO2 SERPL-SCNC: 28 MMOL/L (ref 20–33)
CREAT SERPL-MCNC: 0.83 MG/DL (ref 0.5–1.4)
EOSINOPHIL # BLD AUTO: 0.1 K/UL (ref 0–0.51)
EOSINOPHIL NFR BLD: 3.6 % (ref 0–6.9)
ERYTHROCYTE [DISTWIDTH] IN BLOOD BY AUTOMATED COUNT: 45.2 FL (ref 35.9–50)
FASTING STATUS PATIENT QL REPORTED: NORMAL
GLOBULIN SER CALC-MCNC: 2.8 G/DL (ref 1.9–3.5)
GLUCOSE SERPL-MCNC: 97 MG/DL (ref 65–99)
HCT VFR BLD AUTO: 45.1 % (ref 37–47)
HDLC SERPL-MCNC: 43 MG/DL
HGB BLD-MCNC: 15 G/DL (ref 12–16)
IMM GRANULOCYTES # BLD AUTO: 0.01 K/UL (ref 0–0.11)
IMM GRANULOCYTES NFR BLD AUTO: 0.4 % (ref 0–0.9)
LDLC SERPL CALC-MCNC: 111 MG/DL
LYMPHOCYTES # BLD AUTO: 1 K/UL (ref 1–4.8)
LYMPHOCYTES NFR BLD: 35.6 % (ref 22–41)
MCH RBC QN AUTO: 32.7 PG (ref 27–33)
MCHC RBC AUTO-ENTMCNC: 33.3 G/DL (ref 33.6–35)
MCV RBC AUTO: 98.3 FL (ref 81.4–97.8)
MONOCYTES # BLD AUTO: 0.3 K/UL (ref 0–0.85)
MONOCYTES NFR BLD AUTO: 10.7 % (ref 0–13.4)
NEUTROPHILS # BLD AUTO: 1.37 K/UL (ref 2–7.15)
NEUTROPHILS NFR BLD: 48.6 % (ref 44–72)
NRBC # BLD AUTO: 0 K/UL
NRBC BLD-RTO: 0 /100 WBC
PLATELET # BLD AUTO: 268 K/UL (ref 164–446)
PMV BLD AUTO: 10.3 FL (ref 9–12.9)
POTASSIUM SERPL-SCNC: 4.2 MMOL/L (ref 3.6–5.5)
PROT SERPL-MCNC: 7.1 G/DL (ref 6–8.2)
RBC # BLD AUTO: 4.59 M/UL (ref 4.2–5.4)
SODIUM SERPL-SCNC: 137 MMOL/L (ref 135–145)
TRIGL SERPL-MCNC: 98 MG/DL (ref 0–149)
WBC # BLD AUTO: 2.8 K/UL (ref 4.8–10.8)

## 2018-10-30 PROCEDURE — 85025 COMPLETE CBC W/AUTO DIFF WBC: CPT

## 2018-10-30 PROCEDURE — 80053 COMPREHEN METABOLIC PANEL: CPT

## 2018-10-30 PROCEDURE — 80061 LIPID PANEL: CPT

## 2018-10-30 PROCEDURE — 82306 VITAMIN D 25 HYDROXY: CPT

## 2018-10-30 PROCEDURE — 36415 COLL VENOUS BLD VENIPUNCTURE: CPT

## 2018-11-01 ENCOUNTER — APPOINTMENT (OUTPATIENT)
Dept: ADMISSIONS | Facility: MEDICAL CENTER | Age: 73
End: 2018-11-01
Attending: ORTHOPAEDIC SURGERY
Payer: MEDICARE

## 2018-11-01 DIAGNOSIS — Z01.810 PRE-OPERATIVE CARDIOVASCULAR EXAMINATION: ICD-10-CM

## 2018-11-01 LAB — EKG IMPRESSION: NORMAL

## 2018-11-01 RX ORDER — AMOXICILLIN 500 MG
CAPSULE ORAL DAILY
COMMUNITY

## 2018-11-02 ENCOUNTER — APPOINTMENT (RX ONLY)
Dept: URBAN - METROPOLITAN AREA CLINIC 4 | Facility: CLINIC | Age: 73
Setting detail: DERMATOLOGY
End: 2018-11-02

## 2018-11-02 DIAGNOSIS — Z85.820 PERSONAL HISTORY OF MALIGNANT MELANOMA OF SKIN: ICD-10-CM

## 2018-11-02 DIAGNOSIS — D22 MELANOCYTIC NEVI: ICD-10-CM

## 2018-11-02 DIAGNOSIS — L57.0 ACTINIC KERATOSIS: ICD-10-CM

## 2018-11-02 DIAGNOSIS — D18.0 HEMANGIOMA: ICD-10-CM

## 2018-11-02 DIAGNOSIS — L82.1 OTHER SEBORRHEIC KERATOSIS: ICD-10-CM

## 2018-11-02 DIAGNOSIS — Z85.828 PERSONAL HISTORY OF OTHER MALIGNANT NEOPLASM OF SKIN: ICD-10-CM

## 2018-11-02 DIAGNOSIS — L81.4 OTHER MELANIN HYPERPIGMENTATION: ICD-10-CM

## 2018-11-02 PROBLEM — D22.5 MELANOCYTIC NEVI OF TRUNK: Status: ACTIVE | Noted: 2018-11-02

## 2018-11-02 PROBLEM — D22.72 MELANOCYTIC NEVI OF LEFT LOWER LIMB, INCLUDING HIP: Status: ACTIVE | Noted: 2018-11-02

## 2018-11-02 PROBLEM — D22.62 MELANOCYTIC NEVI OF LEFT UPPER LIMB, INCLUDING SHOULDER: Status: ACTIVE | Noted: 2018-11-02

## 2018-11-02 PROBLEM — D22.61 MELANOCYTIC NEVI OF RIGHT UPPER LIMB, INCLUDING SHOULDER: Status: ACTIVE | Noted: 2018-11-02

## 2018-11-02 PROBLEM — D22.71 MELANOCYTIC NEVI OF RIGHT LOWER LIMB, INCLUDING HIP: Status: ACTIVE | Noted: 2018-11-02

## 2018-11-02 PROBLEM — D18.01 HEMANGIOMA OF SKIN AND SUBCUTANEOUS TISSUE: Status: ACTIVE | Noted: 2018-11-02

## 2018-11-02 PROCEDURE — ? SUNSCREEN RECOMMENDATIONS

## 2018-11-02 PROCEDURE — 99213 OFFICE O/P EST LOW 20 MIN: CPT | Mod: 25

## 2018-11-02 PROCEDURE — ? COUNSELING

## 2018-11-02 PROCEDURE — ? LIQUID NITROGEN

## 2018-11-02 PROCEDURE — 17000 DESTRUCT PREMALG LESION: CPT

## 2018-11-02 ASSESSMENT — LOCATION SIMPLE DESCRIPTION DERM
LOCATION SIMPLE: RIGHT CHEEK
LOCATION SIMPLE: LEFT THIGH
LOCATION SIMPLE: LEFT UPPER BACK
LOCATION SIMPLE: RIGHT LOWER BACK
LOCATION SIMPLE: LEFT CHEEK
LOCATION SIMPLE: RIGHT POSTERIOR UPPER ARM
LOCATION SIMPLE: LEFT POSTERIOR UPPER ARM
LOCATION SIMPLE: UPPER BACK
LOCATION SIMPLE: LEFT FOREARM
LOCATION SIMPLE: LEFT HAND
LOCATION SIMPLE: ABDOMEN
LOCATION SIMPLE: LEFT ANTERIOR NECK
LOCATION SIMPLE: RIGHT UPPER BACK
LOCATION SIMPLE: RIGHT FOREARM
LOCATION SIMPLE: LEFT NOSE
LOCATION SIMPLE: RIGHT THIGH
LOCATION SIMPLE: RIGHT HAND

## 2018-11-02 ASSESSMENT — LOCATION DETAILED DESCRIPTION DERM
LOCATION DETAILED: LEFT INFERIOR ANTERIOR NECK
LOCATION DETAILED: LEFT PROXIMAL DORSAL FOREARM
LOCATION DETAILED: SUPERIOR THORACIC SPINE
LOCATION DETAILED: INFERIOR THORACIC SPINE
LOCATION DETAILED: LEFT NASAL ALA
LOCATION DETAILED: RIGHT DISTAL POSTERIOR UPPER ARM
LOCATION DETAILED: LEFT PROXIMAL POSTERIOR UPPER ARM
LOCATION DETAILED: LEFT SUPERIOR MEDIAL UPPER BACK
LOCATION DETAILED: RIGHT PROXIMAL DORSAL FOREARM
LOCATION DETAILED: PERIUMBILICAL SKIN
LOCATION DETAILED: RIGHT ANTERIOR PROXIMAL THIGH
LOCATION DETAILED: RIGHT RADIAL DORSAL HAND
LOCATION DETAILED: LEFT CENTRAL MALAR CHEEK
LOCATION DETAILED: LEFT ULNAR DORSAL HAND
LOCATION DETAILED: LEFT ANTERIOR PROXIMAL THIGH
LOCATION DETAILED: RIGHT CENTRAL MALAR CHEEK
LOCATION DETAILED: RIGHT RIB CAGE
LOCATION DETAILED: RIGHT SUPERIOR MEDIAL MIDBACK
LOCATION DETAILED: RIGHT INFERIOR UPPER BACK

## 2018-11-02 ASSESSMENT — LOCATION ZONE DERM
LOCATION ZONE: FACE
LOCATION ZONE: HAND
LOCATION ZONE: ARM
LOCATION ZONE: TRUNK
LOCATION ZONE: NECK
LOCATION ZONE: LEG
LOCATION ZONE: NOSE

## 2018-11-02 NOTE — PROCEDURE: COUNSELING
Detail Level: Zone
Detail Level: Detailed
Quality 137: Melanoma: Continuity Of Care - Recall System: Patient information entered into a recall system that includes: target date for the next exam specified AND a process to follow up with patients regarding missed or unscheduled appointments
Quality 224: Stage 0-Iic Melanoma: Overutilization Of Imaging Studies For Only Stage 0-Iic Melanoma: None of the following diagnostic imaging studies ordered: chest X-ray, CT, Ultrasound, MRI, PET, or nuclear medicine scans (ML)
Detail Level: Generalized

## 2018-11-05 ENCOUNTER — HOSPITAL ENCOUNTER (OUTPATIENT)
Facility: MEDICAL CENTER | Age: 73
End: 2018-11-05
Attending: ORTHOPAEDIC SURGERY | Admitting: ORTHOPAEDIC SURGERY
Payer: MEDICARE

## 2018-11-05 VITALS
WEIGHT: 127.43 LBS | SYSTOLIC BLOOD PRESSURE: 131 MMHG | BODY MASS INDEX: 21.23 KG/M2 | OXYGEN SATURATION: 97 % | HEIGHT: 65 IN | DIASTOLIC BLOOD PRESSURE: 66 MMHG | HEART RATE: 80 BPM | TEMPERATURE: 98.1 F | RESPIRATION RATE: 16 BRPM

## 2018-11-05 PROCEDURE — 502580 HCHG PACK, KNEE ARTHROSCOPY: Performed by: ORTHOPAEDIC SURGERY

## 2018-11-05 PROCEDURE — 700101 HCHG RX REV CODE 250

## 2018-11-05 PROCEDURE — 160048 HCHG OR STATISTICAL LEVEL 1-5: Performed by: ORTHOPAEDIC SURGERY

## 2018-11-05 PROCEDURE — 160041 HCHG SURGERY MINUTES - EA ADDL 1 MIN LEVEL 4: Performed by: ORTHOPAEDIC SURGERY

## 2018-11-05 PROCEDURE — 501838 HCHG SUTURE GENERAL: Performed by: ORTHOPAEDIC SURGERY

## 2018-11-05 PROCEDURE — A9270 NON-COVERED ITEM OR SERVICE: HCPCS

## 2018-11-05 PROCEDURE — 160035 HCHG PACU - 1ST 60 MINS PHASE I: Performed by: ORTHOPAEDIC SURGERY

## 2018-11-05 PROCEDURE — 160002 HCHG RECOVERY MINUTES (STAT): Performed by: ORTHOPAEDIC SURGERY

## 2018-11-05 PROCEDURE — 160046 HCHG PACU - 1ST 60 MINS PHASE II: Performed by: ORTHOPAEDIC SURGERY

## 2018-11-05 PROCEDURE — 160025 RECOVERY II MINUTES (STATS): Performed by: ORTHOPAEDIC SURGERY

## 2018-11-05 PROCEDURE — 160009 HCHG ANES TIME/MIN: Performed by: ORTHOPAEDIC SURGERY

## 2018-11-05 PROCEDURE — 700111 HCHG RX REV CODE 636 W/ 250 OVERRIDE (IP)

## 2018-11-05 PROCEDURE — 700102 HCHG RX REV CODE 250 W/ 637 OVERRIDE(OP)

## 2018-11-05 PROCEDURE — 160029 HCHG SURGERY MINUTES - 1ST 30 MINS LEVEL 4: Performed by: ORTHOPAEDIC SURGERY

## 2018-11-05 RX ORDER — MEMANTINE HYDROCHLORIDE 5 MG/1
TABLET ORAL
Qty: 180 TAB | Refills: 1 | Status: SHIPPED | OUTPATIENT
Start: 2018-11-05 | End: 2019-05-21 | Stop reason: SDUPTHER

## 2018-11-05 RX ORDER — GABAPENTIN 300 MG/1
CAPSULE ORAL
Status: COMPLETED
Start: 2018-11-05 | End: 2018-11-05

## 2018-11-05 RX ORDER — SODIUM CHLORIDE, SODIUM LACTATE, POTASSIUM CHLORIDE, CALCIUM CHLORIDE 600; 310; 30; 20 MG/100ML; MG/100ML; MG/100ML; MG/100ML
INJECTION, SOLUTION INTRAVENOUS CONTINUOUS
Status: DISCONTINUED | OUTPATIENT
Start: 2018-11-05 | End: 2018-11-05 | Stop reason: HOSPADM

## 2018-11-05 RX ORDER — HYDROMORPHONE HYDROCHLORIDE 2 MG/ML
0.1 INJECTION, SOLUTION INTRAMUSCULAR; INTRAVENOUS; SUBCUTANEOUS
Status: DISCONTINUED | OUTPATIENT
Start: 2018-11-05 | End: 2018-11-05 | Stop reason: HOSPADM

## 2018-11-05 RX ORDER — OXYCODONE HCL 5 MG/5 ML
5 SOLUTION, ORAL ORAL
Status: DISCONTINUED | OUTPATIENT
Start: 2018-11-05 | End: 2018-11-05 | Stop reason: HOSPADM

## 2018-11-05 RX ORDER — ACETAMINOPHEN 500 MG
TABLET ORAL
Status: COMPLETED
Start: 2018-11-05 | End: 2018-11-05

## 2018-11-05 RX ORDER — HYDRALAZINE HYDROCHLORIDE 20 MG/ML
5 INJECTION INTRAMUSCULAR; INTRAVENOUS
Status: DISCONTINUED | OUTPATIENT
Start: 2018-11-05 | End: 2018-11-05 | Stop reason: HOSPADM

## 2018-11-05 RX ORDER — DIPHENHYDRAMINE HYDROCHLORIDE 50 MG/ML
12.5 INJECTION INTRAMUSCULAR; INTRAVENOUS
Status: DISCONTINUED | OUTPATIENT
Start: 2018-11-05 | End: 2018-11-05 | Stop reason: HOSPADM

## 2018-11-05 RX ORDER — MIDAZOLAM HYDROCHLORIDE 1 MG/ML
1 INJECTION INTRAMUSCULAR; INTRAVENOUS
Status: DISCONTINUED | OUTPATIENT
Start: 2018-11-05 | End: 2018-11-05 | Stop reason: HOSPADM

## 2018-11-05 RX ORDER — ACETAMINOPHEN 500 MG
1000 TABLET ORAL ONCE
Status: CANCELLED | OUTPATIENT
Start: 2018-11-05 | End: 2018-11-05

## 2018-11-05 RX ORDER — CELECOXIB 200 MG/1
400 CAPSULE ORAL ONCE
Status: CANCELLED | OUTPATIENT
Start: 2018-11-05 | End: 2018-11-05

## 2018-11-05 RX ORDER — CELECOXIB 200 MG/1
CAPSULE ORAL
Status: COMPLETED
Start: 2018-11-05 | End: 2018-11-05

## 2018-11-05 RX ORDER — HALOPERIDOL 5 MG/ML
1 INJECTION INTRAMUSCULAR
Status: DISCONTINUED | OUTPATIENT
Start: 2018-11-05 | End: 2018-11-05 | Stop reason: HOSPADM

## 2018-11-05 RX ORDER — LABETALOL HYDROCHLORIDE 5 MG/ML
5 INJECTION, SOLUTION INTRAVENOUS
Status: DISCONTINUED | OUTPATIENT
Start: 2018-11-05 | End: 2018-11-05 | Stop reason: HOSPADM

## 2018-11-05 RX ORDER — HYDROMORPHONE HYDROCHLORIDE 2 MG/ML
0.2 INJECTION, SOLUTION INTRAMUSCULAR; INTRAVENOUS; SUBCUTANEOUS
Status: DISCONTINUED | OUTPATIENT
Start: 2018-11-05 | End: 2018-11-05 | Stop reason: HOSPADM

## 2018-11-05 RX ORDER — HYDROMORPHONE HYDROCHLORIDE 2 MG/ML
0.4 INJECTION, SOLUTION INTRAMUSCULAR; INTRAVENOUS; SUBCUTANEOUS
Status: DISCONTINUED | OUTPATIENT
Start: 2018-11-05 | End: 2018-11-05 | Stop reason: HOSPADM

## 2018-11-05 RX ORDER — ONDANSETRON 2 MG/ML
4 INJECTION INTRAMUSCULAR; INTRAVENOUS
Status: DISCONTINUED | OUTPATIENT
Start: 2018-11-05 | End: 2018-11-05 | Stop reason: HOSPADM

## 2018-11-05 RX ORDER — OXYCODONE HCL 5 MG/5 ML
10 SOLUTION, ORAL ORAL
Status: DISCONTINUED | OUTPATIENT
Start: 2018-11-05 | End: 2018-11-05 | Stop reason: HOSPADM

## 2018-11-05 RX ORDER — MEPERIDINE HYDROCHLORIDE 25 MG/ML
12.5 INJECTION INTRAMUSCULAR; INTRAVENOUS; SUBCUTANEOUS
Status: DISCONTINUED | OUTPATIENT
Start: 2018-11-05 | End: 2018-11-05 | Stop reason: HOSPADM

## 2018-11-05 RX ORDER — SODIUM CHLORIDE, SODIUM LACTATE, POTASSIUM CHLORIDE, CALCIUM CHLORIDE 600; 310; 30; 20 MG/100ML; MG/100ML; MG/100ML; MG/100ML
INJECTION, SOLUTION INTRAVENOUS ONCE
Status: COMPLETED | OUTPATIENT
Start: 2018-11-05 | End: 2018-11-05

## 2018-11-05 RX ORDER — GABAPENTIN 300 MG/1
300 CAPSULE ORAL ONCE
Status: CANCELLED | OUTPATIENT
Start: 2018-11-05 | End: 2018-11-05

## 2018-11-05 RX ORDER — METOPROLOL TARTRATE 1 MG/ML
1 INJECTION, SOLUTION INTRAVENOUS
Status: DISCONTINUED | OUTPATIENT
Start: 2018-11-05 | End: 2018-11-05 | Stop reason: HOSPADM

## 2018-11-05 RX ORDER — BUPIVACAINE HYDROCHLORIDE 2.5 MG/ML
INJECTION, SOLUTION EPIDURAL; INFILTRATION; INTRACAUDAL
Status: DISCONTINUED | OUTPATIENT
Start: 2018-11-05 | End: 2018-11-05 | Stop reason: HOSPADM

## 2018-11-05 RX ADMIN — GABAPENTIN 300 MG: 300 CAPSULE ORAL at 10:27

## 2018-11-05 RX ADMIN — ACETAMINOPHEN 1000 MG: 500 TABLET, COATED ORAL at 10:26

## 2018-11-05 RX ADMIN — SODIUM CHLORIDE, SODIUM LACTATE, POTASSIUM CHLORIDE, CALCIUM CHLORIDE: 600; 310; 30; 20 INJECTION, SOLUTION INTRAVENOUS at 08:50

## 2018-11-05 RX ADMIN — CELECOXIB 400 MG: 200 CAPSULE ORAL at 10:26

## 2018-11-05 ASSESSMENT — PAIN SCALES - GENERAL
PAINLEVEL_OUTOF10: 0

## 2018-11-05 NOTE — OR NURSING
1118- Patient arrived from OR. Respirations even and unlabored with OPA in place. Surgical dressing to L knee C/D/I with brace in place. Pulses 2+ throughout. Toes pink and warm with brisk cap refill. VSS, see flowsheets. 1145- Report to FIDEL Sotelo.    1210- Patient awake. Denies any pain or nausea. Surgical dressing C/D/I. Able to wiggle toes. VSS, see flowsheets. Report to FIDEL Bermudez.   1223- Patient dressed with assistance of RN and transferred to Stage 2.

## 2018-11-05 NOTE — OR NURSING
1145: Rcvd report from FIDEL Kamara and assumed care. Pt still sleeping, respirations spontaneous and non-labored via OPA.   1149: Pt awake, able to lift head and open mouth, OPA removed, pt able to cough on request. No pain, no nausea, awake and looking around.  1153: Updated  via phone, he is in the surgery center lobby waiting room. Pt still having no pain, no nausea, awake and alert, tolerating decrease of supplemental O2.  1204: Report given back to FIDEL Chu and she reassumed care. Pt still having no pain or nausea, awake and alert, tolerating room air.

## 2018-11-05 NOTE — OR NURSING
Patient to preop, allergies and NPO status verified, home medications reconciled, belongings secured, verbalizes understanding of pain scale, surgical site verified, IV access established by Ashleigh, RN, SCDs/ JANES hose in place.

## 2018-11-05 NOTE — DISCHARGE INSTRUCTIONS
ACTIVITY: Rest and take it easy for the first 24 hours.  A responsible adult is recommended to remain with you during that time.  It is normal to feel sleepy.  We encourage you to not do anything that requires balance, judgment or coordination.    MILD FLU-LIKE SYMPTOMS ARE NORMAL. YOU MAY EXPERIENCE GENERALIZED MUSCLE ACHES, THROAT IRRITATION, HEADACHE AND/OR SOME NAUSEA.    FOR 24 HOURS DO NOT:  Drive, operate machinery or run household appliances.  Drink beer or alcoholic beverages.   Make important decisions or sign legal documents.    SPECIAL INSTRUCTIONS: Weight bearing as tolerated.     DIET: To avoid nausea, slowly advance diet as tolerated, avoiding spicy or greasy foods for the first day.  Add more substantial food to your diet according to your physician's instructions.  Babies can be fed formula or breast milk as soon as they are hungry.  INCREASE FLUIDS AND FIBER TO AVOID CONSTIPATION.    SURGICAL DRESSING/BATHING: Keep dressing clean, dry and intact until further instructed by Dr. Arana.     FOLLOW-UP APPOINTMENT:  A follow-up appointment should be arranged with your doctor on November 15th; call to confirm date and time as needed.    You should CALL YOUR PHYSICIAN if you develop:  Fever greater than 101 degrees F.  Pain not relieved by medication, or persistent nausea or vomiting.  Excessive bleeding (blood soaking through dressing) or unexpected drainage from the wound.  Extreme redness or swelling around the incision site, drainage of pus or foul smelling drainage.  Inability to urinate or empty your bladder within 8 hours.  Problems with breathing or chest pain.    You should call 911 if you develop problems with breathing or chest pain.  If you are unable to contact your doctor or surgical center, you should go to the nearest emergency room or urgent care center.  Physician's telephone #:[phone] Dr. Arana    If any questions arise, call your doctor.  If your doctor is not available, please  feel free to call the Surgical Center at [phone].  The Center is open Monday through Friday from 7AM to 7PM.  You can also call the HEALTH HOTLINE open 24 hours/day, 7 days/week and speak to a nurse at [phone], or toll free at [phone].    A registered nurse may call you a few days after your surgery to see how you are doing after your procedure.    MEDICATIONS: Resume taking daily medication.  Take prescribed pain medication with food.  If no medication is prescribed, you may take non-aspirin pain medication if needed.  PAIN MEDICATION CAN BE VERY CONSTIPATING.  Take a stool softener or laxative such as senokot, pericolace, or milk of magnesia if needed.    Prescription given for (previously filled).  Last pain medication given at N/A.    If your physician has prescribed pain medication that includes Acetaminophen (Tylenol), do not take additional Acetaminophen (Tylenol) while taking the prescribed medication.    Depression / Suicide Risk    As you are discharged from this [facility] facility, it is important to learn how to keep safe from harming yourself.    Recognize the warning signs:  · Abrupt changes in personality, positive or negative- including increase in energy   · Giving away possessions  · Change in eating patterns- significant weight changes-  positive or negative  · Change in sleeping patterns- unable to sleep or sleeping all the time   · Unwillingness or inability to communicate  · Depression  · Unusual sadness, discouragement and loneliness  · Talk of wanting to die  · Neglect of personal appearance   · Rebelliousness- reckless behavior  · Withdrawal from people/activities they love  · Confusion- inability to concentrate     If you or a loved one observes any of these behaviors or has concerns about self-harm, here's what you can do:  · Talk about it- your feelings and reasons for harming yourself  · Remove any means that you might use to hurt yourself (examples: pills, rope,  extension cords, firearm)  · Get professional help from the community (Mental Health, Substance Abuse, psychological counseling)  · Do not be alone:Call your Safe Contact- someone whom you trust who will be there for you.  · Call your local CRISIS HOTLINE 683-6817 or 560-516-6018  · Call your local Children's Mobile Crisis Response Team [place] (549) 569-3374 or www.[employer]  · Call the toll free National Suicide Prevention Hotlines   · National Suicide Prevention Lifeline 333-833-EUVO (0674)  National Hope Line Network 800-SUICIDE (232-5588)

## 2018-11-06 NOTE — OP REPORT
DATE OF SERVICE:  11/05/2018    PREOPERATIVE DIAGNOSIS:  Left knee lateral meniscal tear with parameniscal   cyst.    POSTOPERATIVE DIAGNOSES:  1.  Left knee lateral meniscal tear with parameniscal cyst.  2.  Medial meniscal tear.    PROCEDURE:  Left knee arthroscopy with partial medial and lateral   meniscectomies.    SURGEON:  Bryan Arana MD    ANESTHESIA:  General.    BLOOD LOSS:  Minimal.    COMPLICATIONS:  None.    INDICATION:  The patient is a 73-year-old woman with a history of left lateral   knee pain and swelling related to activities.  MRI shows complex lateral   meniscal tear with parameniscal cyst.  She is indicated for arthroscopic   management.    FINDINGS:  Exam under anesthesia revealed no significant effusion.  There was   focal firm swelling at the lateral joint line, full range of motion,   ligamentous exam is stable.  Arthroscopic examination of the suprapatellar   pouch and medial and lateral gutters was unremarkable.  Examination of the   patella revealed mild grade II chondrosis.  Examination of the notch revealed   the cruciate ligaments to be intact.  Examination of the lateral compartment   revealed some mild grade II chondrosis of the lateral femoral condyle.  There   was a complex tear with the primary horizontal cleavage component of the   lateral meniscus.  There is some mucoid degeneration of the meniscal tissue.    Examination of medial compartment revealed a small undersurface flap tear on   the posterior horn of the medial meniscus.    DESCRIPTION OF PROCEDURE:  Following induction of general anesthesia, time-out   was performed confirming patient, site, and procedure.  Two grams of Ancef IV   were ordered and administered.  Left thigh tourniquet and thigh young were   applied and right leg was placed in a well leg young.  Under sterile   conditions, left knee was injected with 30 mL of 0.25% plain Marcaine in the   standard fashion.  Left lower extremity was prepped and  draped in the usual   fashion.  Standard anterolateral and anteromedial portals with supralateral   outflow were established and diagnostic arthroscopy was performed with the   findings as above.  The shaver was used to debride the lateral meniscus,   resecting the superior and inferior leaves back to a stable configuration.  I   could not express any of the cyst fluid through the tear site.  It was clear   that the meniscus was debrided back to a stable configuration.  Next, the 3.5   shaver was used to debride the undersurface of the medial meniscus resecting   the small unstable flap.  The arthroscope was withdrawn, the portals were   closed with nylon sutures and 30 mL of 0.25% plain Marcaine was injected into   the joint.  Sterile dressing was applied followed by JANES hose and knee   immobilizer.  Patient was awakened and extubated in the operating room and   transferred to recovery room in stable condition.       ____________________________________     MD RUBY Amezquita / JASON    DD:  11/06/2018 08:16:41  DT:  11/06/2018 08:43:52    D#:  [id number]  Job#:  [id number]

## 2018-11-07 ENCOUNTER — OFFICE VISIT (OUTPATIENT)
Dept: MEDICAL GROUP | Age: 73
End: 2018-11-07
Payer: MEDICARE

## 2018-11-07 VITALS
WEIGHT: 127 LBS | HEART RATE: 78 BPM | HEIGHT: 65 IN | OXYGEN SATURATION: 98 % | SYSTOLIC BLOOD PRESSURE: 112 MMHG | TEMPERATURE: 98.9 F | BODY MASS INDEX: 21.16 KG/M2 | DIASTOLIC BLOOD PRESSURE: 66 MMHG

## 2018-11-07 DIAGNOSIS — E78.5 HYPERLIPIDEMIA WITH TARGET LDL LESS THAN 130: ICD-10-CM

## 2018-11-07 DIAGNOSIS — M25.462 PAIN AND SWELLING OF KNEE, LEFT: ICD-10-CM

## 2018-11-07 DIAGNOSIS — M25.562 PAIN AND SWELLING OF KNEE, LEFT: ICD-10-CM

## 2018-11-07 DIAGNOSIS — R03.0 ELEVATED BLOOD PRESSURE READING: ICD-10-CM

## 2018-11-07 DIAGNOSIS — D72.819 LEUKOPENIA, UNSPECIFIED TYPE: ICD-10-CM

## 2018-11-07 PROCEDURE — 99214 OFFICE O/P EST MOD 30 MIN: CPT | Performed by: INTERNAL MEDICINE

## 2018-11-07 NOTE — ASSESSMENT & PLAN NOTE
Patient had high blood pressure in previous visit on 9/10/18.  She has never been diagnosed with hypertension.  It was likely due to her pain from the left knee.  Her blood pressure has been improved to normal at her baseline.  Patient is advised to eat low sodium diet and check blood pressure at home regularly.

## 2018-11-07 NOTE — PROGRESS NOTES
Subjective:   Olga Lake is a 73 y.o. female here today for evaluation and management of:      Pain and swelling of knee, left  Patient has arthroscopic left knee surgery for lateral medial meniscus tear by Dr. Bryan Arana on 11/5/18.  Patient stated that her pain is well controlled.  She took Percocet 2 tablets on Monday and 2 tablets on Tuesday.  She stated that her pain is well controlled and does not require to take Percocet today.  She will start physical therapy today.  Patient stated that she is taking MiraLAX for her bowel movement.    Leukopenia  Patient has chronic leukopenia and neutropenia which is stable.  She is asymptomatic.  She was evaluated by our [place] hematologist in May 2012 and has normal bone marrow biopsy.    Hyperlipidemia with target LDL less than 130  Patient is taking pravastatin 20 mg every evening.  She stated that she eats more vegetable and fish.  She has not been exercise due to left knee pain.  LDL level increased to 111 in recent blood test.  We discussed to continue pravastatin 20 mg daily with the same dose.  Patient will start physical exercise with walking if she recovers from left knee surgery.  Her liver enzymes are within normal.    Results for OLGA LAKE (MRN [id number]) as of 11/7/2018 08:26   Ref. Range 4/25/2018 07:07 10/30/2018 07:10   Cholesterol,Tot Latest Ref Range: 100 - 199 mg/dL 154 174   Triglycerides Latest Ref Range: 0 - 149 mg/dL 70 98   HDL Latest Ref Range: >=40 mg/dL 42 43   LDL Latest Ref Range: <100 mg/dL 98 111 (H)       Elevated blood pressure reading  Patient had high blood pressure in previous visit on 9/10/18.  She has never been diagnosed with hypertension.  It was likely due to her pain from the left knee.  Her blood pressure has been improved to normal at her baseline.  Patient is advised to eat low sodium diet and check blood pressure at home regularly.         Current medicines (including changes today)  Current Outpatient Prescriptions  "  Medication Sig Dispense Refill   • memantine (NAMENDA) 5 MG Tab TAKE 1 TABLET BY MOUTH TWICE A  Tab 1   • Omega-3 Fatty Acids (FISH OIL) 1200 MG Cap Take  by mouth every day.     • GLUCOSAMINE HCL-MSM PO Take 1,500 mg by mouth every day.     • Cholecalciferol (VITAMIN D3) 5000 units Tab Take  by mouth every day.     • pravastatin (PRAVACHOL) 20 MG Tab TAKE 1 TABLET ORALLY AT BEDTIME 90 Tab 3   • Multiple Vitamins-Minerals (MULTI FOR HER 50+) Tab Take  by mouth.     • Probiotic Product (PRO-BIOTIC BLEND) Cap Take  by mouth.     • aspirin EC (ECOTRIN) 81 MG TBEC Take 81 mg by mouth every day.         No current facility-administered medications for this visit.      She  has a past medical history of Basal cell carcinoma of skin; Cancer (HCC) (2016, 2017); High cholesterol; Hyperlipidemia LDL goal < 130 (3/21/2012); Leukopenia (3/21/2012); and Melanoma of back ([facility]) (6/2016).    ROS   No chest pain, no shortness of breath, no abdominal pain       Objective:     Blood pressure 112/66, pulse 78, temperature 37.2 °C (98.9 °F), height 1.651 m (5' 5\"), weight 57.6 kg (127 lb), last menstrual period 03/01/2004, SpO2 98 %, not currently breastfeeding. Body mass index is 21.13 kg/m².   Physical Exam:  General: Alert, oriented and no acute distress.  Eye contact is good, speech goal directed, affect calm  HEENT: conjunctiva non-injected, sclera non-icteric.  Oral mucous membranes pink and moist with no lesions.  Pinna normal.   Lungs: Normal respiratory effort, clear to auscultation bilaterally with good excursion.  CV: regular rate and rhythm. No murmurs.   Abdomen: soft, non distended, nontender, Bowel sound normal.  Ext: no edema, color normal, vascularity normal, temperature normal  Musculoskeletal exam: Splint on the left knee.       Assessment and Plan:   The following treatment plan was discussed     1. Elevated blood pressure reading  - Blood pressure is back to normal.  Advised to eat low-sodium diet.  Advised " to monitor blood pressure regularly at home.    2. Hyperlipidemia with target LDL less than 130  - LDL not at goal yet.  LDL increased in recent blood test.  Patient has not been exercise due to left knee pain.  We discussed to continue pravastatin 20 mg every evening and retest cholesterol in 6 months.  She is advised to start regular walking exercise after she recovers from left knee arthroscopic meniscus repair surgery.   - COMP METABOLIC PANEL; Future.  - LIPID PROFILE; Future    3. Leukopenia, unspecified type  - Chronic and stable.  Continue to monitor.    4. Pain and swelling of knee, left  - Patient has left knee arthroscopic surgery for medial and lateral meniscal repair with Dr. Arana on 11/5/2018.  Her pain is stable and well controlled.  She will start physical therapy today.  Patient is advised to eat more fiber and increase water intake and take MiraLAX as needed for bowel movement.  She stated that she already stopped taking Percocet as her pain was manageable.    5. Health Maintenance   - Patient completed shingles vaccine and she already received flu vaccine for this year.      Followup: Return in about 6 months (around 5/7/2019), or if symptoms worsen or fail to improve, for Hyperlipidemia, Osteopenia, leukopenia, Lab review.      Please note that this dictation was created using voice recognition software. I have made every reasonable attempt to correct obvious errors, but I expect that there may have unintended errors in text, spelling, punctuation, or grammar that I did not discover.

## 2018-11-07 NOTE — ASSESSMENT & PLAN NOTE
Patient has arthroscopic left knee surgery for lateral medial meniscus tear by Dr. Bryan Arana on 11/5/18.  Patient stated that her pain is well controlled.  She took Percocet 2 tablets on Monday and 2 tablets on Tuesday.  She stated that her pain is well controlled and does not require to take Percocet today.  She will start physical therapy today.  Patient stated that she is taking MiraLAX for her bowel movement.

## 2018-11-07 NOTE — ASSESSMENT & PLAN NOTE
Patient is taking pravastatin 20 mg every evening.  She stated that she eats more vegetable and fish.  She has not been exercise due to left knee pain.  LDL level increased to 111 in recent blood test.  We discussed to continue pravastatin 20 mg daily with the same dose.  Patient will start physical exercise with walking if she recovers from left knee surgery.  Her liver enzymes are within normal.    Results for JAYA, OLGA GIBBS (MRN [id number]) as of 11/7/2018 08:26   Ref. Range 4/25/2018 07:07 10/30/2018 07:10   Cholesterol,Tot Latest Ref Range: 100 - 199 mg/dL 154 174   Triglycerides Latest Ref Range: 0 - 149 mg/dL 70 98   HDL Latest Ref Range: >=40 mg/dL 42 43   LDL Latest Ref Range: <100 mg/dL 98 111 (H)

## 2018-11-07 NOTE — ASSESSMENT & PLAN NOTE
Patient has chronic leukopenia and neutropenia which is stable.  She is asymptomatic.  She was evaluated by our [place] hematologist in May 2012 and has normal bone marrow biopsy.

## 2019-02-01 ENCOUNTER — APPOINTMENT (RX ONLY)
Dept: URBAN - METROPOLITAN AREA CLINIC 4 | Facility: CLINIC | Age: 74
Setting detail: DERMATOLOGY
End: 2019-02-01

## 2019-02-01 DIAGNOSIS — Z85.820 PERSONAL HISTORY OF MALIGNANT MELANOMA OF SKIN: ICD-10-CM

## 2019-02-01 DIAGNOSIS — D18.0 HEMANGIOMA: ICD-10-CM

## 2019-02-01 DIAGNOSIS — L57.0 ACTINIC KERATOSIS: ICD-10-CM

## 2019-02-01 DIAGNOSIS — L82.1 OTHER SEBORRHEIC KERATOSIS: ICD-10-CM

## 2019-02-01 DIAGNOSIS — L81.4 OTHER MELANIN HYPERPIGMENTATION: ICD-10-CM

## 2019-02-01 DIAGNOSIS — D22 MELANOCYTIC NEVI: ICD-10-CM

## 2019-02-01 PROBLEM — D22.62 MELANOCYTIC NEVI OF LEFT UPPER LIMB, INCLUDING SHOULDER: Status: ACTIVE | Noted: 2019-02-01

## 2019-02-01 PROBLEM — D18.01 HEMANGIOMA OF SKIN AND SUBCUTANEOUS TISSUE: Status: ACTIVE | Noted: 2019-02-01

## 2019-02-01 PROBLEM — D22.61 MELANOCYTIC NEVI OF RIGHT UPPER LIMB, INCLUDING SHOULDER: Status: ACTIVE | Noted: 2019-02-01

## 2019-02-01 PROBLEM — D22.5 MELANOCYTIC NEVI OF TRUNK: Status: ACTIVE | Noted: 2019-02-01

## 2019-02-01 PROBLEM — D22.72 MELANOCYTIC NEVI OF LEFT LOWER LIMB, INCLUDING HIP: Status: ACTIVE | Noted: 2019-02-01

## 2019-02-01 PROBLEM — D22.71 MELANOCYTIC NEVI OF RIGHT LOWER LIMB, INCLUDING HIP: Status: ACTIVE | Noted: 2019-02-01

## 2019-02-01 PROCEDURE — 17000 DESTRUCT PREMALG LESION: CPT

## 2019-02-01 PROCEDURE — 99213 OFFICE O/P EST LOW 20 MIN: CPT | Mod: 25

## 2019-02-01 PROCEDURE — ? LIQUID NITROGEN

## 2019-02-01 PROCEDURE — ? COUNSELING

## 2019-02-01 PROCEDURE — 17003 DESTRUCT PREMALG LES 2-14: CPT

## 2019-02-01 PROCEDURE — ? SUNSCREEN RECOMMENDATIONS

## 2019-02-01 ASSESSMENT — LOCATION DETAILED DESCRIPTION DERM
LOCATION DETAILED: RIGHT ANTERIOR PROXIMAL THIGH
LOCATION DETAILED: RIGHT SUPERIOR MEDIAL MIDBACK
LOCATION DETAILED: LEFT ULNAR DORSAL HAND
LOCATION DETAILED: PERIUMBILICAL SKIN
LOCATION DETAILED: RIGHT RADIAL DORSAL HAND
LOCATION DETAILED: SUPERIOR THORACIC SPINE
LOCATION DETAILED: LEFT SUPERIOR MEDIAL UPPER BACK
LOCATION DETAILED: RIGHT CENTRAL MALAR CHEEK
LOCATION DETAILED: NASAL ROOT
LOCATION DETAILED: RIGHT SUPERIOR FOREHEAD
LOCATION DETAILED: RIGHT RIB CAGE
LOCATION DETAILED: LEFT ANTERIOR PROXIMAL THIGH
LOCATION DETAILED: RIGHT PROXIMAL DORSAL FOREARM
LOCATION DETAILED: LEFT PROXIMAL POSTERIOR UPPER ARM
LOCATION DETAILED: LEFT PROXIMAL DORSAL FOREARM
LOCATION DETAILED: LEFT CENTRAL MALAR CHEEK
LOCATION DETAILED: LEFT INFERIOR ANTERIOR NECK
LOCATION DETAILED: RIGHT DISTAL POSTERIOR UPPER ARM

## 2019-02-01 ASSESSMENT — LOCATION SIMPLE DESCRIPTION DERM
LOCATION SIMPLE: UPPER BACK
LOCATION SIMPLE: LEFT UPPER BACK
LOCATION SIMPLE: RIGHT CHEEK
LOCATION SIMPLE: RIGHT LOWER BACK
LOCATION SIMPLE: LEFT THIGH
LOCATION SIMPLE: RIGHT POSTERIOR UPPER ARM
LOCATION SIMPLE: NOSE
LOCATION SIMPLE: LEFT CHEEK
LOCATION SIMPLE: LEFT ANTERIOR NECK
LOCATION SIMPLE: ABDOMEN
LOCATION SIMPLE: RIGHT FOREHEAD
LOCATION SIMPLE: LEFT HAND
LOCATION SIMPLE: LEFT FOREARM
LOCATION SIMPLE: RIGHT FOREARM
LOCATION SIMPLE: RIGHT HAND
LOCATION SIMPLE: RIGHT THIGH
LOCATION SIMPLE: LEFT POSTERIOR UPPER ARM

## 2019-02-01 ASSESSMENT — LOCATION ZONE DERM
LOCATION ZONE: HAND
LOCATION ZONE: LEG
LOCATION ZONE: FACE
LOCATION ZONE: ARM
LOCATION ZONE: NECK
LOCATION ZONE: TRUNK
LOCATION ZONE: NOSE

## 2019-02-01 NOTE — PROCEDURE: LIQUID NITROGEN
Duration Of Freeze Thaw-Cycle (Seconds): 3
Detail Level: Simple
Consent: The patient's consent was obtained including but not limited to risks of crusting, scabbing, blistering, scarring, darker or lighter pigmentary change, recurrence, incomplete removal and infection.
Render Post-Care Instructions In Note?: yes
Number Of Freeze-Thaw Cycles: 2 freeze-thaw cycles
Post-Care Instructions: I reviewed with the patient in detail post-care instructions. Patient is to wear sunprotection, and avoid picking at any of the treated lesions. Pt may apply Vaseline to crusted or scabbing areas.

## 2019-04-02 DIAGNOSIS — E78.5 HYPERLIPIDEMIA WITH TARGET LDL LESS THAN 130: ICD-10-CM

## 2019-04-02 RX ORDER — PRAVASTATIN SODIUM 20 MG
TABLET ORAL
Qty: 90 TAB | Refills: 3 | Status: SHIPPED | OUTPATIENT
Start: 2019-04-02 | End: 2019-10-10 | Stop reason: SDUPTHER

## 2019-04-09 ENCOUNTER — OFFICE VISIT (OUTPATIENT)
Dept: MEDICAL GROUP | Age: 74
End: 2019-04-09
Payer: MEDICARE

## 2019-04-09 VITALS
HEART RATE: 64 BPM | BODY MASS INDEX: 22.09 KG/M2 | HEIGHT: 65 IN | WEIGHT: 132.6 LBS | TEMPERATURE: 98.8 F | SYSTOLIC BLOOD PRESSURE: 138 MMHG | DIASTOLIC BLOOD PRESSURE: 80 MMHG | OXYGEN SATURATION: 100 %

## 2019-04-09 DIAGNOSIS — N30.01 ACUTE CYSTITIS WITH HEMATURIA: ICD-10-CM

## 2019-04-09 LAB
APPEARANCE UR: CLEAR
BILIRUB UR STRIP-MCNC: NEGATIVE MG/DL
COLOR UR AUTO: YELLOW
GLUCOSE UR STRIP.AUTO-MCNC: NEGATIVE MG/DL
KETONES UR STRIP.AUTO-MCNC: NEGATIVE MG/DL
LEUKOCYTE ESTERASE UR QL STRIP.AUTO: NORMAL
NITRITE UR QL STRIP.AUTO: NEGATIVE
PH UR STRIP.AUTO: 7 [PH] (ref 5–8)
PROT UR QL STRIP: NEGATIVE MG/DL
RBC UR QL AUTO: NORMAL
SP GR UR STRIP.AUTO: 1.01
UROBILINOGEN UR STRIP-MCNC: NORMAL MG/DL

## 2019-04-09 PROCEDURE — 81002 URINALYSIS NONAUTO W/O SCOPE: CPT | Performed by: INTERNAL MEDICINE

## 2019-04-09 PROCEDURE — 99214 OFFICE O/P EST MOD 30 MIN: CPT | Performed by: INTERNAL MEDICINE

## 2019-04-09 RX ORDER — NITROFURANTOIN 25; 75 MG/1; MG/1
100 CAPSULE ORAL 2 TIMES DAILY
Qty: 14 CAP | Refills: 0 | Status: SHIPPED | OUTPATIENT
Start: 2019-04-09 | End: 2019-04-16

## 2019-04-09 ASSESSMENT — PAIN SCALES - GENERAL: PAINLEVEL: NO PAIN

## 2019-04-09 ASSESSMENT — PATIENT HEALTH QUESTIONNAIRE - PHQ9: CLINICAL INTERPRETATION OF PHQ2 SCORE: 0

## 2019-04-09 NOTE — PROGRESS NOTES
Subjective:   Lia Da Silva is a 73 y.o. female here today for evaluation and management of:    Acute cystitis with hematuria  Acute.   Symptom onset: 1 day ago. Patient first noticed mild symptoms yesterday of dysuria but tried flushing her symptoms with water.  Current symptoms: painful, urgent, frequent voids secondary to her frequent water intake. No blood noted in urine.   Since onset symptoms are: unchanged  Treatments tried: She has tried flushing her system with water but otherwise denies trying cranberry juice or other symptoms  Associated symptoms: negative for fever, chills, flank pain, nausea and vomiting, vaginal discharge, vaginal itching, spotting, pelvic pain.  History is negative for frequent UTI.       Current medicines (including changes today)  Current Outpatient Prescriptions   Medication Sig Dispense Refill   • pravastatin (PRAVACHOL) 20 MG Tab TAKE 1 TABLET ORALLY AT BEDTIME 90 Tab 3   • memantine (NAMENDA) 5 MG Tab TAKE 1 TABLET BY MOUTH TWICE A  Tab 1   • Omega-3 Fatty Acids (FISH OIL) 1200 MG Cap Take  by mouth every day.     • GLUCOSAMINE HCL-MSM PO Take 1,500 mg by mouth every day.     • Cholecalciferol (VITAMIN D3) 5000 units Tab Take  by mouth every day.     • Multiple Vitamins-Minerals (MULTI FOR HER 50+) Tab Take  by mouth.     • Probiotic Product (PRO-BIOTIC BLEND) Cap Take  by mouth.     • aspirin EC (ECOTRIN) 81 MG TBEC Take 81 mg by mouth every day.         No current facility-administered medications for this visit.      She  has a past medical history of Basal cell carcinoma of skin; Cancer (HCC) (2016, 2017); High cholesterol; Hyperlipidemia LDL goal < 130 (3/21/2012); Leukopenia (3/21/2012); and Melanoma of back (HCC) (6/2016).    ROS   Positive for dysuria, urgency.  No chest pain, no shortness of breath, no abdominal pain  No fever, flank pain, chills, nausea, or vomiting  No hematochezia, vaginal discharge or vaginal itching     Objective:     /80 (BP  "Location: Right arm, Patient Position: Sitting, BP Cuff Size: Adult)   Pulse 64   Temp 37.1 °C (98.8 °F) (Temporal)   Ht 1.651 m (5' 5\")   Wt 60.1 kg (132 lb 9.6 oz)   SpO2 100%  Body mass index is 22.07 kg/m².     Physical Exam:  General: Alert, oriented and no acute distress.  Eye contact is good, speech goal directed, affect calm  HEENT: conjunctiva non-injected, sclera non-icteric.  Oral mucous membranes pink and moist with no lesions.  Pinna normal.   Lungs: Normal respiratory effort, clear to auscultation bilaterally with good excursion.  CV: regular rate and rhythm. No murmurs.  Abdomen: soft, non distended, nontender, No CVAT, Bowel sound normal.  Ext: no edema, color normal, vascularity normal, temperature normal      I discussed the POCT urinalysis test with the patient in clinic today.    Reviewed the POCT results with the patient indicating positive inflammation and blood.    Results for OLGA LAKE (MRN [id number]) as of 4/9/2019 09:25   Ref. Range 4/9/2019 09:15   POC Color Latest Ref Range: Negative  Yellow   POC Appearance Latest Ref Range: Negative  Clear   POC Specific Gravity Latest Ref Range: <1.005 - >1.030  1.015   POC Urine PH Latest Ref Range: 5.0 - 8.0  7.0   POC Glucose Latest Ref Range: Negative mg/dL Negative   POC Ketones Latest Ref Range: Negative mg/dL Negative   POC Protein Latest Ref Range: Negative mg/dL Negative   POC Nitrites Latest Ref Range: Negative  Negative   POC Leukocyte Esterase Latest Ref Range: Negative  Small   POC Blood Latest Ref Range: Negative  Small   POC Bilirubin Latest Ref Range: Negative mg/dL Negative   POC Urobiligen Latest Ref Range: Negative (0.2) mg/dL 0.2 E.U. /dL       Assessment and Plan:   The following treatment plan was discussed     1. Acute cystitis with hematuria  Plan discussed with patient which is to treat her urinary symptoms with Nitrofurantoin 100 MG. She will take this 1 tablet twice a day for 7 days. Medication side effects were " discussed which can include loose stool.  - Recommended urgent medical attention if patient has high fever, chills, flank pain, nausea, vomiting, or if she is unable to tolerate the medication.  - Continue to flush with water, cranberry juice, and probiotics.  Recommended to avoid bladder irritants such as caffeine, alcohol or spicy food.  - POCT Urinalysis  - nitrofurantoin monohyd macro (MACROBID) 100 MG Cap; Take 1 Cap by mouth 2 times a day for 7 days.  Dispense: 14 Cap; Refill: 0      Followup: Return in about 4 weeks (around 5/7/2019), or if symptoms worsen or fail to improve, for Hyperlipidemia, Osteopenia, mild cognitive impairment, Lab review.      Please note that this dictation was created using voice recognition software. I have made every reasonable attempt to correct obvious errors, but I expect that there may have unintended errors in text, spelling, punctuation, or grammar that I did not discover.    I, Noemy Tejada (Gautam), am scribing for, and in the presence of, Gayle Graham M.D..    Electronically signed by: Noemy Tejada (Gautam), 4/9/2019    I, Gayle Graham M.D., personally performed the services described in this documentation, as scribed by Noemy Tejada in my presence, and it is both accurate and complete.

## 2019-04-15 ENCOUNTER — HOSPITAL ENCOUNTER (OUTPATIENT)
Dept: LAB | Facility: MEDICAL CENTER | Age: 74
End: 2019-04-15
Attending: INTERNAL MEDICINE
Payer: MEDICARE

## 2019-04-15 DIAGNOSIS — E78.5 HYPERLIPIDEMIA WITH TARGET LDL LESS THAN 130: ICD-10-CM

## 2019-04-15 LAB
ALBUMIN SERPL BCP-MCNC: 4.1 G/DL (ref 3.2–4.9)
ALBUMIN/GLOB SERPL: 1.8 G/DL
ALP SERPL-CCNC: 80 U/L (ref 30–99)
ALT SERPL-CCNC: 17 U/L (ref 2–50)
ANION GAP SERPL CALC-SCNC: 6 MMOL/L (ref 0–11.9)
AST SERPL-CCNC: 27 U/L (ref 12–45)
BILIRUB SERPL-MCNC: 0.7 MG/DL (ref 0.1–1.5)
BUN SERPL-MCNC: 16 MG/DL (ref 8–22)
CALCIUM SERPL-MCNC: 9.2 MG/DL (ref 8.5–10.5)
CHLORIDE SERPL-SCNC: 106 MMOL/L (ref 96–112)
CHOLEST SERPL-MCNC: 157 MG/DL (ref 100–199)
CO2 SERPL-SCNC: 27 MMOL/L (ref 20–33)
CREAT SERPL-MCNC: 0.87 MG/DL (ref 0.5–1.4)
FASTING STATUS PATIENT QL REPORTED: NORMAL
GLOBULIN SER CALC-MCNC: 2.3 G/DL (ref 1.9–3.5)
GLUCOSE SERPL-MCNC: 101 MG/DL (ref 65–99)
HDLC SERPL-MCNC: 41 MG/DL
LDLC SERPL CALC-MCNC: 105 MG/DL
POTASSIUM SERPL-SCNC: 3.7 MMOL/L (ref 3.6–5.5)
PROT SERPL-MCNC: 6.4 G/DL (ref 6–8.2)
SODIUM SERPL-SCNC: 139 MMOL/L (ref 135–145)
TRIGL SERPL-MCNC: 56 MG/DL (ref 0–149)

## 2019-04-15 PROCEDURE — 80053 COMPREHEN METABOLIC PANEL: CPT

## 2019-04-15 PROCEDURE — 80061 LIPID PANEL: CPT

## 2019-04-15 PROCEDURE — 36415 COLL VENOUS BLD VENIPUNCTURE: CPT

## 2019-04-30 ENCOUNTER — TELEPHONE (OUTPATIENT)
Dept: MEDICAL GROUP | Age: 74
End: 2019-04-30

## 2019-04-30 NOTE — TELEPHONE ENCOUNTER
ESTABLISHED PATIENT PRE-VISIT PLANNING     Patient was NOT contacted to complete PVP.     Note: Patient will not be contacted if there is no indication to call.     1.  Reviewed notes from the last few office visits within the medical group: Yes    2.  If any orders were placed at last visit or intended to be done for this visit (i.e. 6 mos follow-up), do we have Results/Consult Notes?        •  Labs - Labs ordered, completed on 4/15/19 and results are in chart.   Note: If patient appointment is for lab review and patient did not complete labs, check with provider if OK to reschedule patient until labs completed.       •  Imaging - SCHEDULED       •  Referrals - No referrals were ordered at last office visit.    3. Is this appointment scheduled as a Hospital Follow-Up? No    4.  Immunizations were updated in Epic using WebIZ?: Epic matches WebIZ       •  Web Iz Recommendations: Patient is up to date on all vaccines    5.  Patient is due for the following Health Maintenance Topics:   Health Maintenance Due   Topic Date Due   • Annual Wellness Visit  01/04/2019   • MAMMOGRAM  04/25/2019           6. Orders for overdue Health Maintenance topics pended in Pre-Charting? N\A    7.  AHA (MDX) form printed for Provider? YES    8.  Patient was NOT informed to arrive 15 min prior to their scheduled appointment and bring in their medication bottles.

## 2019-05-07 ENCOUNTER — OFFICE VISIT (OUTPATIENT)
Dept: MEDICAL GROUP | Age: 74
End: 2019-05-07
Payer: MEDICARE

## 2019-05-07 VITALS
TEMPERATURE: 99.1 F | DIASTOLIC BLOOD PRESSURE: 78 MMHG | HEIGHT: 65 IN | BODY MASS INDEX: 21.92 KG/M2 | WEIGHT: 131.6 LBS | HEART RATE: 68 BPM | OXYGEN SATURATION: 99 % | SYSTOLIC BLOOD PRESSURE: 134 MMHG

## 2019-05-07 DIAGNOSIS — E78.5 HYPERLIPIDEMIA WITH TARGET LDL LESS THAN 130: ICD-10-CM

## 2019-05-07 DIAGNOSIS — Z98.890 H/O MELANOMA EXCISION: ICD-10-CM

## 2019-05-07 DIAGNOSIS — R73.01 IFG (IMPAIRED FASTING GLUCOSE): ICD-10-CM

## 2019-05-07 DIAGNOSIS — Z85.828 H/O BASAL CELL CARCINOMA EXCISION: ICD-10-CM

## 2019-05-07 DIAGNOSIS — Z85.820 H/O MELANOMA EXCISION: ICD-10-CM

## 2019-05-07 DIAGNOSIS — G31.84 MCI (MILD COGNITIVE IMPAIRMENT): ICD-10-CM

## 2019-05-07 DIAGNOSIS — Z98.890 H/O BASAL CELL CARCINOMA EXCISION: ICD-10-CM

## 2019-05-07 DIAGNOSIS — M85.80 OSTEOPENIA WITH HIGH RISK OF FRACTURE: ICD-10-CM

## 2019-05-07 PROBLEM — N30.01 ACUTE CYSTITIS WITH HEMATURIA: Status: RESOLVED | Noted: 2019-04-09 | Resolved: 2019-05-07

## 2019-05-07 PROCEDURE — 99214 OFFICE O/P EST MOD 30 MIN: CPT | Performed by: INTERNAL MEDICINE

## 2019-05-07 PROCEDURE — 8041 PR SCP AHA: Performed by: INTERNAL MEDICINE

## 2019-05-07 ASSESSMENT — ENCOUNTER SYMPTOMS: GENERAL WELL-BEING: GOOD

## 2019-05-07 ASSESSMENT — PAIN SCALES - GENERAL: PAINLEVEL: NO PAIN

## 2019-05-07 ASSESSMENT — PATIENT HEALTH QUESTIONNAIRE - PHQ9: CLINICAL INTERPRETATION OF PHQ2 SCORE: 0

## 2019-05-07 ASSESSMENT — ACTIVITIES OF DAILY LIVING (ADL): BATHING_REQUIRES_ASSISTANCE: 0

## 2019-05-07 NOTE — PROGRESS NOTES
Subjective:   Olga Lake is a 73 y.o. female here today for evaluation and management of:    Osteopenia with high risk of fracture  History of osteopenia. Patient is taking Vitamin D 5000 units. Her last bone density scan was done in December 2017 which showed decreased bone density in the left femur area and increased bone density to lumbar back. Patient is regularly doing yoga and walking. Denies a history of fractures.  She preferred not to take any prescription medication.  She wants to continue to control her bone density with vitamin supplements and exercise.  She follows with dentist twice a year and states that she does not have any teeth or jaw infection or problems.    MCI (mild cognitive impairment)  Stable. Patient admits that she does sometimes temporarily forget things but is able to quickly recall it. She takes Memantine 5 MG BID without side effects as well as multivitamins once a day. She is being followed by Dr. Mayo and has an appointment with him on 5/21. Patient confirms that she is able to drive, cook, and manage herself without difficulties and is regularly doing brain exercises by working out cross word puzzles and Sudoku. She lives with her .     Hyperlipidemia with target LDL less than 130  This is improving but still elevated. Her ASCVD risk is high at 13.9%. She is continuing to take Pravastatin 20 MG once every evening without side effects. I discussed the blood test with the patient in clinic today. With taking Pravastatin her LDL has improved from 111 to 105, however still elevated.     Results for OLGA LAKE (MRN [id number]) as of 5/7/2019 11:32   Ref. Range 10/30/2018 07:10 4/15/2019 06:40   Cholesterol,Tot Latest Ref Range: 100 - 199 mg/dL 174 157   Triglycerides Latest Ref Range: 0 - 149 mg/dL 98 56   HDL Latest Ref Range: >=40 mg/dL 43 41   LDL Latest Ref Range: <100 mg/dL 111 (H) 105 (H)       IFG (impaired fasting glucose)  Patient's blood sugar reading is still  borderline high, but does not indicate diabetes. I discussed the lab tests with the patient in clinic today.     Results for OLGA LAKE (MRN [id number]) as of 5/7/2019 11:32   Ref. Range 4/15/2019 06:40   A-G Ratio Latest Units: g/dL 1.8       H/O melanoma excision  Patient has a history of melanoma to the left side of her neck. This did not metastasize and was excised by by dermatologist.  She follows with PRANAY Roberto at the skin cancer and dermatology [place] approximately 1 year ago every 3 months and has not had any recurrent lesions.      H/O basal cell carcinoma excision  History of basal cell carcinoma. This was excised by dermatologist a few years ago. She is being monitored by PRANAY Roberto at the [facility] cancer and dermat[facility] [place]. No recurrent symptoms. Patient applies sun screen daily.      Current medicines (including changes today)  Current Outpatient Prescriptions   Medication Sig Dispense Refill   • pravastatin (PRAVACHOL) 20 MG Tab TAKE 1 TABLET ORALLY AT BEDTIME 90 Tab 3   • memantine (NAMENDA) 5 MG Tab TAKE 1 TABLET BY MOUTH TWICE A  Tab 1   • Omega-3 Fatty Acids (FISH OIL) 1200 MG Cap Take  by mouth every day.     • GLUCOSAMINE HCL-MSM PO Take 1,500 mg by mouth every day.     • Cholecalciferol (VITAMIN D3) 5000 units Tab Take  by mouth every day.     • Multiple Vitamins-Minerals (MULTI FOR HER 50+) Tab Take  by mouth.     • Probiotic Product (PRO-BIOTIC BLEND) Cap Take  by mouth.     • aspirin EC (ECOTRIN) 81 MG TBEC Take 81 mg by mouth every day.         No current facility-administered medications for this visit.      She  has a past medical history of Basal cell carcinoma of skin; Cancer (HCC) (2016, 2017); High cholesterol; Hyperlipidemia LDL goal < 130 (3/21/2012); Leukopenia (3/21/2012); and Melanoma of back (HCC) (6/2016).    ROS   No chest pain, no shortness of breath, no abdominal pain       Objective:     /78 (BP Location: Right arm, Patient  "Position: Sitting, BP Cuff Size: Adult)   Pulse 68   Temp 37.3 °C (99.1 °F) (Temporal)   Ht 1.651 m (5' 5\")   Wt 59.7 kg (131 lb 9.6 oz)   SpO2 99%  Body mass index is 21.9 kg/m².     Physical Exam:  General: Alert, oriented and no acute distress.  Eye contact is good, speech goal directed, affect calm  HEENT: conjunctiva non-injected, sclera non-icteric.  Oral mucous membranes pink and moist with no lesions.  Pinna normal. TM pearly gray.   Neck: No supraclavicular, submandibular, submental lymphadenopathy or masses in the neck or supraclavicular regions.  Lungs: Normal respiratory effort, clear to auscultation bilaterally with good excursion.  CV: regular rate and rhythm. No murmurs.   Abdomen: soft, non distended, nontender, Bowel sound normal.  Ext: no edema, color normal, vascularity normal, temperature normal      Annual Health Assessment Questions:     1.  Are you currently engaging in any exercise or physical activity? Yes     2.  How would you describe your mood or emotional well-being today? good     3.  Have you had any falls in the last year? No     4.  Have you noticed any problems with your balance or had difficulty walking? No     5.  In the last six months have you experienced any leakage of urine? No     6. DPA/Advanced Directive: Patient has Living Will, but it is not on file. Instructed to bring in a copy to scan into their chart.       Assessment and Plan:   The following treatment plan was discussed     1. Osteopenia with high risk of fracture  - Discussed bone density scan with the patient in clinic today. She is not taking any prescription medication.  She preferred to continue to manage with vitamin supplements and weightbearing exercise.  - Advised to do weight bearing exercises and walking regularly.   - Continue taking Vitamin D and calcium supplements.   - Plan to repeat DEXA scan at the end of this year in December.   - CBC WITH DIFFERENTIAL; Future  - VITAMIN D,25 HYDROXY; " Future    2. MCI (mild cognitive impairment)  - Well-controlled and stable. Patient is regularly doing brain exercises.  Continue to take memantine 5 mg twice daily as prescribed by Dr. Mayo, neurologist.  Recheck lab 1-2 weeks before next follow up visit.  - Advised to continue taking multivitamins daily.   - She will continue to follow with Dr. Mayo and keep her upcoming appointment on 5/21.  - CBC WITH DIFFERENTIAL; Future  - Comp Metabolic Panel; Future    3. Hyperlipidemia with target LDL less than 130  - LDL improved.  ASCVD risk is high. Patient will continue taking Pravastatin 20 MG once every evening. Recheck lab 1-2 weeks before next follow up visit.  - Reviewed the risks and benefits of treatment and potential side effects of medication.  - Advised to eat low fat, low carbohydrate and high fiber diet as well as do cardio physical exercise regularly.   - Comp Metabolic Panel; Future  - Lipid Profile; Future    4. H/O melanoma excision  - Stable. She has no recurrent lesions.  - Patient advised to continue to monitor for reoccurring lesions. She will check for changes in appearance, border, color, and size.   - She will continue to follow with PRANAY Roberto at the skin cancer and dermatology [place] every 3 months.     5. H/O basal cell carcinoma excision  - Stable. She has no recurrent lesions.  Recommend to apply sunscreen regularly.  - Patient advised to continue to monitor for reoccurring lesions. She will check for changes in appearance, color, and size.   - She will continue to follow with PRANAY Roberto at the skin cancer and dermat[facility] [place], every 3 months.     6. IFG (impaired fasting glucose)  - Not well controlled but does not need to be treated with medication.   - Patient is advised to avoid processed sugar.   - Counseled on balanced diet and regular exercise.   - HEMOGLOBIN A1C; Future    7. Health Maintenance   - Patient is due for a mammogram. She already has an  screening mammogram appointment for this in the future.     AHA and MDX form are reviewed and completed in clinic today.  Appropriate discussion and counseling are provided based on annual health assessment questions.      Followup: Return in about 6 months (around 11/7/2019), or if symptoms worsen or fail to improve, for Hyperlipidemia, Impaired fasting glucose, Osteopenia, Lab review.      Please note that this dictation was created using voice recognition software. I have made every reasonable attempt to correct obvious errors, but I expect that there may have unintended errors in text, spelling, punctuation, or grammar that I did not discover.    I, Noemy Tejada (Scribe), am scribing for, and in the presence of, Gayle Graham M.D..    Electronically signed by: Noemy Tejada (Isadoraibe), 5/7/2019    I, Gayle Graham M.D., personally performed the services described in this documentation, as scribed by Noemy Tejada in my presence, and it is both accurate and complete.             copd exacerbation

## 2019-05-07 NOTE — PROGRESS NOTES
Annual Health Assessment Questions:    1.  Are you currently engaging in any exercise or physical activity? Yes    2.  How would you describe your mood or emotional well-being today? good    3.  Have you had any falls in the last year? No    4.  Have you noticed any problems with your balance or had difficulty walking? No    5.  In the last six months have you experienced any leakage of urine? No    6. DPA/Advanced Directive: Patient has Living Will, but it is not on file. Instructed to bring in a copy to scan into their chart.

## 2019-05-08 ENCOUNTER — APPOINTMENT (RX ONLY)
Dept: URBAN - METROPOLITAN AREA CLINIC 4 | Facility: CLINIC | Age: 74
Setting detail: DERMATOLOGY
End: 2019-05-08

## 2019-05-08 ENCOUNTER — HOSPITAL ENCOUNTER (OUTPATIENT)
Dept: RADIOLOGY | Facility: MEDICAL CENTER | Age: 74
End: 2019-05-08
Attending: INTERNAL MEDICINE
Payer: MEDICARE

## 2019-05-08 DIAGNOSIS — L82.1 OTHER SEBORRHEIC KERATOSIS: ICD-10-CM

## 2019-05-08 DIAGNOSIS — Z12.31 VISIT FOR SCREENING MAMMOGRAM: ICD-10-CM

## 2019-05-08 DIAGNOSIS — L81.4 OTHER MELANIN HYPERPIGMENTATION: ICD-10-CM

## 2019-05-08 DIAGNOSIS — D22 MELANOCYTIC NEVI: ICD-10-CM

## 2019-05-08 DIAGNOSIS — D18.0 HEMANGIOMA: ICD-10-CM

## 2019-05-08 DIAGNOSIS — D485 NEOPLASM OF UNCERTAIN BEHAVIOR OF SKIN: ICD-10-CM

## 2019-05-08 PROBLEM — D22.72 MELANOCYTIC NEVI OF LEFT LOWER LIMB, INCLUDING HIP: Status: ACTIVE | Noted: 2019-05-08

## 2019-05-08 PROBLEM — D48.5 NEOPLASM OF UNCERTAIN BEHAVIOR OF SKIN: Status: ACTIVE | Noted: 2019-05-08

## 2019-05-08 PROBLEM — D22.61 MELANOCYTIC NEVI OF RIGHT UPPER LIMB, INCLUDING SHOULDER: Status: ACTIVE | Noted: 2019-05-08

## 2019-05-08 PROBLEM — D22.71 MELANOCYTIC NEVI OF RIGHT LOWER LIMB, INCLUDING HIP: Status: ACTIVE | Noted: 2019-05-08

## 2019-05-08 PROBLEM — D18.01 HEMANGIOMA OF SKIN AND SUBCUTANEOUS TISSUE: Status: ACTIVE | Noted: 2019-05-08

## 2019-05-08 PROBLEM — D22.5 MELANOCYTIC NEVI OF TRUNK: Status: ACTIVE | Noted: 2019-05-08

## 2019-05-08 PROBLEM — D22.62 MELANOCYTIC NEVI OF LEFT UPPER LIMB, INCLUDING SHOULDER: Status: ACTIVE | Noted: 2019-05-08

## 2019-05-08 PROCEDURE — ? COUNSELING

## 2019-05-08 PROCEDURE — ? BIOPSY BY SHAVE METHOD

## 2019-05-08 PROCEDURE — 77063 BREAST TOMOSYNTHESIS BI: CPT

## 2019-05-08 PROCEDURE — ? SUNSCREEN RECOMMENDATIONS

## 2019-05-08 PROCEDURE — 99213 OFFICE O/P EST LOW 20 MIN: CPT | Mod: 25

## 2019-05-08 PROCEDURE — 11102 TANGNTL BX SKIN SINGLE LES: CPT | Mod: 59

## 2019-05-08 PROCEDURE — 69100 BIOPSY OF EXTERNAL EAR: CPT

## 2019-05-08 ASSESSMENT — LOCATION DETAILED DESCRIPTION DERM
LOCATION DETAILED: RIGHT SUPERIOR MEDIAL MIDBACK
LOCATION DETAILED: LEFT CLAVICULAR NECK
LOCATION DETAILED: SUPERIOR THORACIC SPINE
LOCATION DETAILED: RIGHT ANTERIOR PROXIMAL THIGH
LOCATION DETAILED: RIGHT DISTAL POSTERIOR UPPER ARM
LOCATION DETAILED: RIGHT POSTERIOR EAR
LOCATION DETAILED: LEFT PROXIMAL POSTERIOR UPPER ARM
LOCATION DETAILED: RIGHT CENTRAL MALAR CHEEK
LOCATION DETAILED: LEFT CENTRAL MALAR CHEEK
LOCATION DETAILED: LEFT ULNAR DORSAL HAND
LOCATION DETAILED: LEFT SUPERIOR MEDIAL UPPER BACK
LOCATION DETAILED: LEFT INFERIOR ANTERIOR NECK
LOCATION DETAILED: RIGHT RADIAL DORSAL HAND
LOCATION DETAILED: LEFT ANTERIOR PROXIMAL THIGH
LOCATION DETAILED: PERIUMBILICAL SKIN
LOCATION DETAILED: RIGHT PROXIMAL DORSAL FOREARM
LOCATION DETAILED: LEFT PROXIMAL DORSAL FOREARM
LOCATION DETAILED: RIGHT RIB CAGE

## 2019-05-08 ASSESSMENT — LOCATION SIMPLE DESCRIPTION DERM
LOCATION SIMPLE: RIGHT HAND
LOCATION SIMPLE: LEFT HAND
LOCATION SIMPLE: LEFT FOREARM
LOCATION SIMPLE: RIGHT CHEEK
LOCATION SIMPLE: ABDOMEN
LOCATION SIMPLE: RIGHT LOWER BACK
LOCATION SIMPLE: RIGHT FOREARM
LOCATION SIMPLE: LEFT UPPER BACK
LOCATION SIMPLE: UPPER BACK
LOCATION SIMPLE: LEFT ANTERIOR NECK
LOCATION SIMPLE: RIGHT POSTERIOR UPPER ARM
LOCATION SIMPLE: RIGHT EAR
LOCATION SIMPLE: LEFT POSTERIOR UPPER ARM
LOCATION SIMPLE: LEFT CHEEK
LOCATION SIMPLE: RIGHT THIGH
LOCATION SIMPLE: LEFT THIGH

## 2019-05-08 ASSESSMENT — LOCATION ZONE DERM
LOCATION ZONE: FACE
LOCATION ZONE: EAR
LOCATION ZONE: NECK
LOCATION ZONE: TRUNK
LOCATION ZONE: LEG
LOCATION ZONE: HAND
LOCATION ZONE: ARM

## 2019-05-08 NOTE — PROCEDURE: BIOPSY BY SHAVE METHOD
Lab Facility: 
Cryotherapy Text: The wound bed was treated with cryotherapy after the biopsy was performed.
Additional Anesthesia Volume In Cc (Will Not Render If 0): 0
Destruction After The Procedure: Yes
Consent: Written consent was obtained and risks were reviewed including but not limited to scarring, infection, bleeding, scabbing, incomplete removal, nerve damage and allergy to anesthesia.
Anesthesia Type: 1% lidocaine with epinephrine
Depth Of Biopsy: dermis
Render Post-Care Instructions In Note?: no
Wound Care: Bacitracin
Electrodesiccation Text: The wound bed was treated with electrodesiccation after the biopsy was performed.
Billing Type: Third-Party Bill
Electrodesiccation And Curettage Text: The wound bed was treated with electrodesiccation and curettage after the biopsy was performed.
Anesthesia Volume In Cc: 2
Type Of Destruction Used: Electrodesiccation and Curettage
Biopsy Type: H and E
Silver Nitrate Text: The wound bed was treated with silver nitrate after the biopsy was performed.
Post-Care Instructions: I reviewed with the patient in detail post-care instructions. Patient is to keep the biopsy site dry overnight, and then apply bacitracin twice daily until healed. Patient may apply hydrogen peroxide soaks to remove any crusting.
Dressing: bandage
Notification Instructions: Patient will be notified of biopsy results. However, patient instructed to call the office if not contacted within 2 weeks.
Curettage Text: The wound bed was treated with curettage after the biopsy was performed.
Biopsy Method: Personna blade
Detail Level: Detailed
Hemostasis: Aluminum Chloride and Electrocautery
Lab: [id number]
Wound Care: Vaseline

## 2019-05-09 ENCOUNTER — PATIENT MESSAGE (OUTPATIENT)
Dept: MEDICAL GROUP | Age: 74
End: 2019-05-09

## 2019-05-09 DIAGNOSIS — M85.80 OSTEOPENIA WITH HIGH RISK OF FRACTURE: ICD-10-CM

## 2019-05-09 RX ORDER — MULTIVIT-MIN/IRON/FOLIC ACID/K 18-600-40
2000 CAPSULE ORAL DAILY
Qty: 30 CAP | Refills: 0 | COMMUNITY
Start: 2019-05-09

## 2019-05-10 NOTE — PATIENT COMMUNICATION
Patient reports that she is taking vitamin D 2000 units daily instead of 5000 units.  The dose for vitamin D is updated in her chart according to her request.    Gayle Graham M.D.

## 2019-05-21 ENCOUNTER — OFFICE VISIT (OUTPATIENT)
Dept: NEUROLOGY | Facility: MEDICAL CENTER | Age: 74
End: 2019-05-21
Payer: MEDICARE

## 2019-05-21 VITALS
HEIGHT: 65 IN | BODY MASS INDEX: 21.63 KG/M2 | WEIGHT: 129.8 LBS | SYSTOLIC BLOOD PRESSURE: 132 MMHG | DIASTOLIC BLOOD PRESSURE: 78 MMHG | TEMPERATURE: 97.9 F | HEART RATE: 76 BPM | OXYGEN SATURATION: 97 %

## 2019-05-21 DIAGNOSIS — R29.818 OTHER SYMPTOMS AND SIGNS INVOLVING THE NERVOUS SYSTEM: ICD-10-CM

## 2019-05-21 DIAGNOSIS — F03.90 DEMENTIA WITHOUT BEHAVIORAL DISTURBANCE, UNSPECIFIED DEMENTIA TYPE: ICD-10-CM

## 2019-05-21 PROBLEM — G31.84 MCI (MILD COGNITIVE IMPAIRMENT): Status: RESOLVED | Noted: 2017-09-13 | Resolved: 2019-05-21

## 2019-05-21 PROCEDURE — 99214 OFFICE O/P EST MOD 30 MIN: CPT | Performed by: PSYCHIATRY & NEUROLOGY

## 2019-05-21 RX ORDER — DONEPEZIL HYDROCHLORIDE 5 MG/1
5 TABLET, FILM COATED ORAL DAILY
Qty: 30 TAB | Refills: 6 | Status: SHIPPED | OUTPATIENT
Start: 2019-05-21 | End: 2019-10-28

## 2019-05-21 RX ORDER — MEMANTINE HYDROCHLORIDE 10 MG/1
10 TABLET ORAL 2 TIMES DAILY
Qty: 180 TAB | Refills: 1 | Status: SHIPPED | OUTPATIENT
Start: 2019-05-21 | End: 2019-09-11 | Stop reason: SDUPTHER

## 2019-05-21 NOTE — PATIENT INSTRUCTIONS
IMPRESSION:    1. Cognitive impairment since 2016-- memory, visual spatial systems were affected- dementia is likely  2. Family Hx of Alzheimer  3. Leucopenia (cause unknown)    PLAN/RECOMMENDATIONS:    We discussed about the blood tests for Dementia  1. APOE apolipoprotein E   2. Pre-senile dementia genes (There are at least three dominant genes that have been identified in cases of familial Alzheimer's disease with early onset, namely the amyloid precursor gene (CANDE), and the genes encoding presenilin 1 (PSEN1) and presenilin 2 (PSEN2). )  3. Some new tests to diagnose Dementia such as CSF tau protein      Generally speaking, we do not offer these tests routinely because these tests are only helpful in a limited clinical situations        ________________________________________________________________________    This time  We will increase Namenda to 10mg two times per day  Also we will add Aricept 5mg daily for memory problems too    Exercise muscle and brain    Quit alcohol altogether      Reduce anxiety by praying, yoga, meditation and etc  ________________________________________________________________________      Advise the following nutritional supplementation  ________________________________________________________________________    Fish Oil -- Omega 3 1000mg 3# daily  Try Daily Probiotic too  Vit D-3 4000 unit daily  Multiple Vitamin Daily  ________________________________________________________________________          SIGNATURE:  Jose Mayo      CC:  Gayle Graham M.D.

## 2019-05-21 NOTE — PROGRESS NOTES
NEUROLOGY NOTE    Referring Physician  Gayle Graham M.D.      CHIEF COMPLAINT:  Memory problems noticed by her family  Father got the dementia at the age of 60+ and passed away in 5 years  Chief Complaint   Patient presents with   • Follow-Up     MCI (mild cognitive impairment)       PRESENT ILLNESS:   Memory problems noticed by her family  Father got the dementia at the age of 60+ and passed away in 5 years    Since last visit, the patient noticed that her memory problems continue to bother her  She would repeat the same things       RED FLAGS for reversible dementia  Headache X  Fluctuation course X  Tremor X  Rapid Progressive onset ( within 2 years) V  MRI hippocampus not atrophy ?        PAST MEDICAL HISTORY:  Past Medical History:   Diagnosis Date   • Basal cell carcinoma of skin    • Cancer ([facility]) 2016, 2017    melanoma, basal cell   • High cholesterol    • Hyperlipidemia LDL goal < 130 3/21/2012   • Leukopenia 3/21/2012   • Melanoma of back ([facility]) 6/2016    Skin cancer/Trista Neeta APRN       PAST SURGICAL HISTORY:  Past Surgical History:   Procedure Laterality Date   • KNEE ARTHROSCOPY Left 11/5/2018    Procedure: KNEE ARTHROSCOPY;  Surgeon: Bryan Arana M.D.;  Location: SURGERY [facility];  Service: Orthopedics   • MENISCECTOMY  11/5/2018    Procedure: MENISCECTOMY - PARTIAL LATERAL;  Surgeon: Bryan Arana M.D.;  Location: SURGERY [facility];  Service: Orthopedics   • BONE MARROW ASPIRATION  11/7/2012    Performed by Johan Vuong M.D. at ENDOSCOPY [facility]   • BONE MARROW BIOPSY, NDL/TROCAR  11/7/2012    Performed by Johan Vuong M.D. at ENDOSCOPY [facility]   • PRIMARY C SECTION  01/17/1960   • BREAST BIOPSY Right 1970's    right breast, benign   • TONSILLECTOMY  as child       FAMILY HISTORY:    Father got the dementia at the age of 60+ and passed away in 5 years    Family History   Problem Relation Age of Onset   • Genetic Father         alzheimers d. 72    • Hyperlipidemia Father    • Hypertension Father    • Cancer Mother         lymphoma d. 86   • Diabetes Brother         resolved with lifestyle mod       SOCIAL HISTORY:  Social History     Social History   • Marital status:      Spouse name: N/A   • Number of children: 3   • Years of education: N/A     Occupational History   •  [employer]     Social History Main Topics   • Smoking status: Never Smoker   • Smokeless tobacco: Never Used   • Alcohol use 0.0 oz/week      Comment: one per week, occ   • Drug use: No   • Sexual activity: Yes     Partners: Male     Other Topics Concern   • Not on file     Social History Narrative    Former . 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    Walks 5 miles per day.      ALLERGIES:  No Known Allergies  TOBHX  History   Smoking Status   • Never Smoker   Smokeless Tobacco   • Never Used     ALCHX  History   Alcohol Use   • 0.0 oz/week     Comment: one per week, occ     DRUGHX  History   Drug Use No           MEDICATIONS:  Current Outpatient Prescriptions   Medication   • Cholecalciferol (VITAMIN D) 2000 units Cap   • pravastatin (PRAVACHOL) 20 MG Tab   • memantine (NAMENDA) 5 MG Tab   • Omega-3 Fatty Acids (FISH OIL) 1200 MG Cap   • GLUCOSAMINE HCL-MSM PO   • Multiple Vitamins-Minerals (MULTI FOR HER 50+) Tab   • Probiotic Product (PRO-BIOTIC BLEND) Cap   • aspirin EC (ECOTRIN) 81 MG TBEC     No current facility-administered medications for this visit.        REVIEW OF SYSTEM:    Constitutional: Denies fevers, Denies weight changes   Eyes: Denies changes in vision, no eye pain   Ears/Nose/Throat/Mouth: Denies nasal congestion or sore throat   Cardiovascular: Denies chest pain or palpitations   Respiratory: Denies SOB.   Gastrointestinal/Hepatic: Denies abdominal pain, nausea, vomiting, diarrhea, constipation or GI bleeding   Genitourinary: Denies bladder dysfunction, dysuria or frequency   Musculoskeletal/Rheum: Denies joint pain and swelling   Skin/Breast: Denies rash,  "denies breast lumps or discharge   Neurological: memory problems  Psychiatric: denies mood disorder   Endocrine: denies hx of diabetes or thyroid dysfunction   Heme/Oncology/Lymph Nodes: Denies enlarged lymph nodes, denies brusing or known bleeding disorder   Allergic/Immunologic: Denies hx of allergies   All other systems were reviewed and are negative (AMA/CMS criteria)       PHYSICAL AND NEUROLOGICAL EXMAINATIONS:  VITAL SIGNS: /78   Pulse 76   Temp 36.6 °C (97.9 °F) (Temporal)   Ht 1.651 m (5' 5\")   Wt 58.9 kg (129 lb 12.8 oz)   LMP 03/01/2004   SpO2 97%   BMI 21.60 kg/m²   CURRENT WEIGHT: BMI: Body mass index is 21.6 kg/m².  PREVIOUS WEIGHTS:  Wt Readings from Last 25 Encounters:   05/21/19 58.9 kg (129 lb 12.8 oz)   05/07/19 59.7 kg (131 lb 9.6 oz)   04/09/19 60.1 kg (132 lb 9.6 oz)   11/07/18 57.6 kg (127 lb)   11/05/18 57.8 kg (127 lb 6.8 oz)   09/10/18 57.9 kg (127 lb 9.6 oz)   05/07/18 57.6 kg (127 lb)   01/11/18 57.6 kg (127 lb)   01/03/18 58.1 kg (128 lb)   12/04/17 57.2 kg (126 lb)   09/13/17 59.2 kg (130 lb 8 oz)   05/09/17 58.5 kg (129 lb)   02/17/17 59.5 kg (131 lb 3.2 oz)   01/20/17 59 kg (130 lb)   07/15/16 57.6 kg (127 lb)   05/29/15 57.5 kg (126 lb 12.8 oz)   01/26/15 58.1 kg (128 lb)   06/20/14 58.1 kg (128 lb)   06/18/13 57.6 kg (127 lb)   05/17/13 58.5 kg (129 lb)   12/04/12 59.4 kg (131 lb)   11/07/12 58.1 kg (128 lb)   09/19/12 58.5 kg (129 lb)   03/21/12 57.6 kg (127 lb)   03/09/12 58.2 kg (128 lb 6.4 oz)       General appearance of patient: WDWN(+) NAD(+)    EYES  o Fundus : Papilledem(-) Exudates(-) Hemorrhage(-)  Nervous System  Orientation to time, place and person(+)  Memory normal(-)  Language: aphasia(-)  Knowledge: past(+) Current(+)  Attention(+)  Cranial Nerves  • Nerve 2: intact  • Nerve 3,4,6: intact  • Nerve 5 : intact  • Nerve 7: intact  • Nerve 8: intact  • Nerve 9 & 10: intact  • Nerve 11: intact  • Nerve 12: intact  Muscle Power and muscle tone: symmetric, " normal in upper and lower  Sensory System: Pin sensation intact(+)  Reflexes: symmetric throughout  Cerebellar Function FNP normal   Gait : Steady(+) TandemGait steady(+)  Heart and Vascular  Peripheral Vasucular system : Edema (-) Swelling(-)  RHB, Breathing sound clear  abdomen bowel sound normoactive  Extremities freely moveable  Joints no contracture       NEUROIMAGING: I reviewed the MRI/CT of brain       LAB:        IMPRESSION:    1. Cognitive impairment since 2016-- memory, visual spatial systems were affected- dementia is likely  2. Family Hx of Alzheimer  3. Leucopenia (cause unknown)    PLAN/RECOMMENDATIONS:    We discussed about the blood tests for Dementia  1. APOE apolipoprotein E   2. Pre-senile dementia genes (There are at least three dominant genes that have been identified in cases of familial Alzheimer's disease with early onset, namely the amyloid precursor gene (CANDE), and the genes encoding presenilin 1 (PSEN1) and presenilin 2 (PSEN2). )  3. Some new tests to diagnose Dementia such as CSF tau protein      Generally speaking, we do not offer these tests routinely because these tests are only helpful in a limited clinical situations        ________________________________________________________________________    This time  We will increase Namenda to 10mg two times per day  Also we will add Aricept 5mg daily for memory problems too    Exercise muscle and brain    Quit alcohol altogether      Reduce anxiety by praying, yoga, meditation and etc  ________________________________________________________________________      Advise the following nutritional supplementation  ________________________________________________________________________    Fish Oil -- Omega 3 1000mg 3# daily  Try Daily Probiotic too  Vit D-3 4000 unit daily  Multiple Vitamin Daily  ________________________________________________________________________          SIGNATURE:  Jose Mayo      CC:  Gayle Graham M.D.

## 2019-05-22 ENCOUNTER — HOSPITAL ENCOUNTER (OUTPATIENT)
Dept: LAB | Facility: MEDICAL CENTER | Age: 74
End: 2019-05-22
Attending: PSYCHIATRY & NEUROLOGY
Payer: MEDICARE

## 2019-05-22 DIAGNOSIS — F03.90 DEMENTIA WITHOUT BEHAVIORAL DISTURBANCE, UNSPECIFIED DEMENTIA TYPE: ICD-10-CM

## 2019-05-22 DIAGNOSIS — R29.818 OTHER SYMPTOMS AND SIGNS INVOLVING THE NERVOUS SYSTEM: ICD-10-CM

## 2019-05-22 LAB — VIT B12 SERPL-MCNC: 634 PG/ML (ref 211–911)

## 2019-05-22 PROCEDURE — 82607 VITAMIN B-12: CPT

## 2019-05-22 PROCEDURE — 36415 COLL VENOUS BLD VENIPUNCTURE: CPT

## 2019-05-23 ENCOUNTER — HOSPITAL ENCOUNTER (OUTPATIENT)
Dept: RADIOLOGY | Facility: MEDICAL CENTER | Age: 74
End: 2019-05-23
Attending: PSYCHIATRY & NEUROLOGY
Payer: MEDICARE

## 2019-05-23 DIAGNOSIS — R29.818 OTHER SYMPTOMS AND SIGNS INVOLVING THE NERVOUS SYSTEM: ICD-10-CM

## 2019-05-23 PROCEDURE — 70551 MRI BRAIN STEM W/O DYE: CPT

## 2019-05-27 ENCOUNTER — TELEPHONE (OUTPATIENT)
Dept: NEUROLOGY | Facility: MEDICAL CENTER | Age: 74
End: 2019-05-27

## 2019-05-28 NOTE — TELEPHONE ENCOUNTER
Spoke to pt and advised her on results below -- she verbalized her understanding and had no complaints

## 2019-07-03 ENCOUNTER — PATIENT OUTREACH (OUTPATIENT)
Dept: HEALTH INFORMATION MANAGEMENT | Facility: OTHER | Age: 74
End: 2019-07-03

## 2019-07-03 NOTE — PROGRESS NOTES
Outcome: no answer    Please transfer to [facility]  [phone] when patient returns call.     [facility] Verified: yes     Attempt # 1

## 2019-07-16 ENCOUNTER — APPOINTMENT (RX ONLY)
Dept: URBAN - METROPOLITAN AREA CLINIC 4 | Facility: CLINIC | Age: 74
Setting detail: DERMATOLOGY
End: 2019-07-16

## 2019-07-16 PROBLEM — C44.41 BASAL CELL CARCINOMA OF SKIN OF SCALP AND NECK: Status: ACTIVE | Noted: 2019-07-16

## 2019-07-16 PROCEDURE — 99212 OFFICE O/P EST SF 10 MIN: CPT

## 2019-07-16 PROCEDURE — ? COUNSELING

## 2019-07-16 NOTE — PROCEDURE: COUNSELING
Patient Specific Counseling (Will Not Stick From Patient To Patient): Site appears well healed with no evidence of recurrence. We will continue to monitor. Patient instructed to contact office with any questions or concerns.
Detail Level: Detailed

## 2019-08-05 ENCOUNTER — APPOINTMENT (RX ONLY)
Dept: URBAN - METROPOLITAN AREA CLINIC 4 | Facility: CLINIC | Age: 74
Setting detail: DERMATOLOGY
End: 2019-08-05

## 2019-08-05 DIAGNOSIS — L81.4 OTHER MELANIN HYPERPIGMENTATION: ICD-10-CM

## 2019-08-05 DIAGNOSIS — D22 MELANOCYTIC NEVI: ICD-10-CM

## 2019-08-05 DIAGNOSIS — Z85.820 PERSONAL HISTORY OF MALIGNANT MELANOMA OF SKIN: ICD-10-CM

## 2019-08-05 DIAGNOSIS — D18.0 HEMANGIOMA: ICD-10-CM

## 2019-08-05 DIAGNOSIS — L82.1 OTHER SEBORRHEIC KERATOSIS: ICD-10-CM

## 2019-08-05 PROBLEM — D22.62 MELANOCYTIC NEVI OF LEFT UPPER LIMB, INCLUDING SHOULDER: Status: ACTIVE | Noted: 2019-08-05

## 2019-08-05 PROBLEM — D18.01 HEMANGIOMA OF SKIN AND SUBCUTANEOUS TISSUE: Status: ACTIVE | Noted: 2019-08-05

## 2019-08-05 PROBLEM — D22.5 MELANOCYTIC NEVI OF TRUNK: Status: ACTIVE | Noted: 2019-08-05

## 2019-08-05 PROBLEM — D22.71 MELANOCYTIC NEVI OF RIGHT LOWER LIMB, INCLUDING HIP: Status: ACTIVE | Noted: 2019-08-05

## 2019-08-05 PROBLEM — D22.61 MELANOCYTIC NEVI OF RIGHT UPPER LIMB, INCLUDING SHOULDER: Status: ACTIVE | Noted: 2019-08-05

## 2019-08-05 PROBLEM — D22.72 MELANOCYTIC NEVI OF LEFT LOWER LIMB, INCLUDING HIP: Status: ACTIVE | Noted: 2019-08-05

## 2019-08-05 PROCEDURE — ? COUNSELING

## 2019-08-05 PROCEDURE — ? SUNSCREEN RECOMMENDATIONS

## 2019-08-05 PROCEDURE — 99213 OFFICE O/P EST LOW 20 MIN: CPT

## 2019-08-05 ASSESSMENT — LOCATION SIMPLE DESCRIPTION DERM
LOCATION SIMPLE: LEFT ANTERIOR NECK
LOCATION SIMPLE: LEFT POSTERIOR UPPER ARM
LOCATION SIMPLE: LEFT CHEEK
LOCATION SIMPLE: LEFT HAND
LOCATION SIMPLE: RIGHT HAND
LOCATION SIMPLE: RIGHT FOREARM
LOCATION SIMPLE: RIGHT LOWER BACK
LOCATION SIMPLE: LEFT FOREARM
LOCATION SIMPLE: ABDOMEN
LOCATION SIMPLE: RIGHT THIGH
LOCATION SIMPLE: RIGHT CHEEK
LOCATION SIMPLE: RIGHT POSTERIOR UPPER ARM
LOCATION SIMPLE: LEFT UPPER BACK
LOCATION SIMPLE: LEFT THIGH
LOCATION SIMPLE: UPPER BACK

## 2019-08-05 ASSESSMENT — LOCATION DETAILED DESCRIPTION DERM
LOCATION DETAILED: RIGHT PROXIMAL DORSAL FOREARM
LOCATION DETAILED: LEFT ULNAR DORSAL HAND
LOCATION DETAILED: LEFT SUPERIOR UPPER BACK
LOCATION DETAILED: SUPERIOR THORACIC SPINE
LOCATION DETAILED: LEFT SUPERIOR MEDIAL UPPER BACK
LOCATION DETAILED: LEFT PROXIMAL POSTERIOR UPPER ARM
LOCATION DETAILED: LEFT ANTERIOR PROXIMAL THIGH
LOCATION DETAILED: RIGHT ANTERIOR PROXIMAL THIGH
LOCATION DETAILED: RIGHT RIB CAGE
LOCATION DETAILED: LEFT CENTRAL MALAR CHEEK
LOCATION DETAILED: PERIUMBILICAL SKIN
LOCATION DETAILED: RIGHT DISTAL POSTERIOR UPPER ARM
LOCATION DETAILED: RIGHT CENTRAL MALAR CHEEK
LOCATION DETAILED: LEFT PROXIMAL DORSAL FOREARM
LOCATION DETAILED: RIGHT RADIAL DORSAL HAND
LOCATION DETAILED: LEFT INFERIOR ANTERIOR NECK
LOCATION DETAILED: RIGHT SUPERIOR MEDIAL MIDBACK

## 2019-08-05 ASSESSMENT — LOCATION ZONE DERM
LOCATION ZONE: LEG
LOCATION ZONE: HAND
LOCATION ZONE: FACE
LOCATION ZONE: ARM
LOCATION ZONE: TRUNK
LOCATION ZONE: NECK

## 2019-08-05 NOTE — PROCEDURE: COUNSELING
Detail Level: Zone
When Should The Patient Follow-Up For Their Next Full-Body Skin Exam?: 6 Months
Detail Level: Detailed
Quality 137: Melanoma: Continuity Of Care - Recall System: Recall system not utilized, reason not otherwise specified

## 2019-09-11 NOTE — TELEPHONE ENCOUNTER
Was the patient seen in the last year in this department? Yes    Does patient have an active prescription for medications requested? Yes    Received Request Via: Pharmacy     Previous pt of dr Mayo. Advised to schedule appt with dr MOBLEY

## 2019-09-16 ENCOUNTER — TELEPHONE (OUTPATIENT)
Dept: NEUROLOGY | Facility: MEDICAL CENTER | Age: 74
End: 2019-09-16

## 2019-09-16 RX ORDER — MEMANTINE HYDROCHLORIDE 10 MG/1
TABLET ORAL
Qty: 180 TAB | Refills: 1 | Status: SHIPPED | OUTPATIENT
Start: 2019-09-16 | End: 2019-09-17 | Stop reason: SDUPTHER

## 2019-09-17 ENCOUNTER — TELEPHONE (OUTPATIENT)
Dept: MEDICAL GROUP | Age: 74
End: 2019-09-17

## 2019-09-17 RX ORDER — MEMANTINE HYDROCHLORIDE 10 MG/1
10 TABLET ORAL 2 TIMES DAILY
Qty: 180 TAB | Refills: 1 | Status: SHIPPED | OUTPATIENT
Start: 2019-09-17 | End: 2020-03-09

## 2019-09-17 NOTE — TELEPHONE ENCOUNTER
Spouse came into office & provided proof of Flu vaccination-[facility] pharmacy @ [facility] on 09/12/19. Scanned to [employer] media.

## 2019-09-17 NOTE — TELEPHONE ENCOUNTER
Was the patient seen in the last year in this department? Yes    Does patient have an active prescription for medications requested? No     Received Request Via: Pharmacy     Patient has appointment with Dr MOBLEY 10/28/2019

## 2019-10-10 DIAGNOSIS — E78.5 HYPERLIPIDEMIA WITH TARGET LDL LESS THAN 130: ICD-10-CM

## 2019-10-10 RX ORDER — PRAVASTATIN SODIUM 20 MG
20 TABLET ORAL EVERY EVENING
Qty: 100 TAB | Refills: 3 | Status: SHIPPED | OUTPATIENT
Start: 2019-10-10 | End: 2019-11-19 | Stop reason: SDUPTHER

## 2019-10-28 ENCOUNTER — OFFICE VISIT (OUTPATIENT)
Dept: NEUROLOGY | Facility: MEDICAL CENTER | Age: 74
End: 2019-10-28
Payer: MEDICARE

## 2019-10-28 VITALS
SYSTOLIC BLOOD PRESSURE: 142 MMHG | TEMPERATURE: 97.6 F | HEART RATE: 74 BPM | RESPIRATION RATE: 14 BRPM | BODY MASS INDEX: 21.43 KG/M2 | DIASTOLIC BLOOD PRESSURE: 70 MMHG | OXYGEN SATURATION: 98 % | HEIGHT: 65 IN | WEIGHT: 128.6 LBS

## 2019-10-28 DIAGNOSIS — F03.90 DEMENTIA WITHOUT BEHAVIORAL DISTURBANCE, UNSPECIFIED DEMENTIA TYPE: ICD-10-CM

## 2019-10-28 PROCEDURE — 99215 OFFICE O/P EST HI 40 MIN: CPT

## 2019-10-28 RX ORDER — DONEPEZIL HYDROCHLORIDE 10 MG/1
10 TABLET, FILM COATED ORAL EVERY EVENING
Qty: 90 TAB | Refills: 3 | Status: SHIPPED | OUTPATIENT
Start: 2019-10-28 | End: 2020-02-21
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
## 2019-10-29 RX ORDER — MEMANTINE HYDROCHLORIDE 5 MG/1
TABLET ORAL
Qty: 180 TAB | Refills: 1 | Status: SHIPPED | OUTPATIENT
Start: 2019-10-29 | End: 2019-11-19

## 2019-10-31 ASSESSMENT — ENCOUNTER SYMPTOMS
EYES NEGATIVE: 1
MEMORY LOSS: 1
NEUROLOGICAL NEGATIVE: 1
NERVOUS/ANXIOUS: 1
MUSCULOSKELETAL NEGATIVE: 1
RESPIRATORY NEGATIVE: 1
INSOMNIA: 1
CARDIOVASCULAR NEGATIVE: 1
GASTROINTESTINAL NEGATIVE: 1

## 2019-10-31 NOTE — PATIENT INSTRUCTIONS
alzheimer  Alzheimer Disease  Alzheimer disease is a brain disease that affects memory, thinking, and behavior. People with Alzheimer disease lose mental abilities, and the disease gets worse over time. Survival with Alzheimer disease ranges from several years to as long as 20 years.  What are the causes?  This condition develops when a protein called beta-amyloid forms deposits in the brain. It is not known what causes these deposits to form.  What increases the risk?  This condition is more likely to develop in people who:  · Are elderly.  · Have a family history of dementia.  · Have had a brain injury.  · Have heart or blood vessel disease.  · Have had a stroke.  · Have high blood pressure or high cholesterol.  · Have diabetes.  What are the signs or symptoms?  Symptoms of this condition happen in three stages, which often overlap.  Early stage  In this stage, you may continue to be independent. You may still be able to drive, work, and be social. Symptoms in this stage include:  · Minor memory problems, such as forgetting a name or what you read.  · Difficulty with:  ¨ Paying attention.  ¨ Communicating.  ¨ Doing familiar tasks.  ¨ Learning new things.  · Needing more time to do daily activities.  · Anxiety.  · Social withdrawal.  · Loss of motivation.  Moderate stage  In this stage, you will start to need care. This stage usually lasts the longest. Symptoms in this stage include:  · Difficulty with expressing thoughts.  · Memory loss that affects daily life. This can include forgetting:  ¨ Your address or phone number.  ¨ Events that have happened.  ¨ Parts of your personal history, like where you went to school.  · Confusion about where you are or what time it is.  · Difficulty in judging distance.  · Changes in personality, mood, and behavior. You may be concepcion, irritable, angry, frustrated, fearful, anxious, or suspicious.  · Poor reasoning and judgment.  · Delusions or hallucinations.  · Changes in sleep  patterns.  · Wandering and getting lost.  Severe stage  In the final stage, you will need help with your personal care and daily activities. Symptoms in this stage include:  · Worsening memory loss.  · Personality changes.  · Loss of awareness of your surroundings.  · Changes in physical abilities, including the ability to walk, sit, and swallow.  · Difficulty in communicating.  · Inability to control the bladder and bowels.  · Increasing confusion.  · Increasing disruptive behavior.  How is this diagnosed?  This condition is diagnosed with an assessment by your health care provider. During this assessment, your health care provider will talk with you and your family, friends, or caregivers about your symptoms.  A thorough medical history will be taken, and you will have a physical exam and tests. Tests may include:  · Lab tests, such as blood or urine tests.  · Imaging tests, such as a CT scan, PET scan, or MRI.  · A lumbar puncture. This test involves removing and testing a small amount of the fluid that surrounds the brain and spinal cord.  · An electroencephalogram (EEG). In this test, small metal discs are used to measure electrical activity in the brain.  · Memory tests, cognitive tests, and neuropsychological tests. These tests evaluate brain function.  How is this treated?  At this time, there is no treatment to cure Alzheimer disease or stop it from getting worse. The goals of treatment are:  · To slow down the disease.  · To manage behavioral problems.  · To provide you with a safe environment.  · To make life easier for you and your caregivers.  The following treatment options are available:  · Medicines. Medicines may help to slow down memory loss and control behavioral symptoms.  · Talk therapy. Talk therapy provides you with education, support, and memory aids. It is most helpful in the early stages of the condition.  · Counseling or spiritual guidance. It is normal to have a lot of feelings, including  anger, relief, fear, and isolation. Counseling and guidance can help you deal with these feelings.  · Caregiving. This involves having caregivers help you with your daily activities. Caregivers may be family members, friends, or trained medical professionals. Caregiving can be done at home or outside the home.  · Family support groups. These provide education, emotional support, and information about community resources to family members who are taking care of you.  Follow these instructions at home:  Medicines  · Take over-the-counter and prescription medicines only as told by your health care provider.  · Avoid taking medicines that can affect thinking, such as pain or sleeping medicines.  Lifestyle  · Make healthy lifestyle choices:  ¨ Be physically active as told by your health care provider.  ¨ Do not use any tobacco products, such as cigarettes, chewing tobacco, and e-cigarettes. If you need help quitting, ask your health care provider.  ¨ Eat a healthy diet.  ¨ Practice stress-management techniques when you get stressed.  ¨ Stay social.  · Drink enough fluid to keep your urine clear or pale yellow.  · Make sure to get quality sleep. These tips can help you get a good night's rest:  ¨ Avoid napping during the day.  ¨ Keep your sleeping area dark and cool.  ¨ Avoid exercising during the few hours before you go to bed.  ¨ Avoid caffeine products in the evening.  General instructions  · Work with your health care provider to determine what you need help with and what your safety needs are.  · If you were given a bracelet that tracks your location, make sure to wear it.  · Keep all follow-up visits as told by your health care provider. This is important.  · If you have questions or would like additional support, you may contact The [employer]:  ¨ 24-hour helpline: 1-855.464.8535  ¨ Website: www.alz.org  Contact a health care provider if:  · You have nausea, vomiting, or trouble with eating.  · You  have dizziness, or weakness.  · You have new or worsening trouble with sleeping.  · You or your family members become concerned for your safety.  Get help right away if:  · You develop chest pain or difficulty with breathing.  · You pass out.  This information is not intended to replace advice given to you by your health care provider. Make sure you discuss any questions you have with your health care provider.  Document Released: 08/29/2005 Document Revised: 08/18/2017 Document Reviewed: 09/14/2016  Elsevier Interactive Patient Education © 2017 [employer]

## 2019-11-06 ENCOUNTER — PATIENT MESSAGE (OUTPATIENT)
Dept: MEDICAL GROUP | Age: 74
End: 2019-11-06

## 2019-11-12 ENCOUNTER — HOSPITAL ENCOUNTER (OUTPATIENT)
Dept: LAB | Facility: MEDICAL CENTER | Age: 74
End: 2019-11-12
Attending: INTERNAL MEDICINE
Payer: MEDICARE

## 2019-11-12 DIAGNOSIS — M85.80 OSTEOPENIA WITH HIGH RISK OF FRACTURE: ICD-10-CM

## 2019-11-12 DIAGNOSIS — G31.84 MCI (MILD COGNITIVE IMPAIRMENT): ICD-10-CM

## 2019-11-12 DIAGNOSIS — R73.01 IFG (IMPAIRED FASTING GLUCOSE): ICD-10-CM

## 2019-11-12 DIAGNOSIS — E78.5 HYPERLIPIDEMIA WITH TARGET LDL LESS THAN 130: ICD-10-CM

## 2019-11-12 LAB
25(OH)D3 SERPL-MCNC: 36 NG/ML (ref 30–100)
ALBUMIN SERPL BCP-MCNC: 4.5 G/DL (ref 3.2–4.9)
ALBUMIN/GLOB SERPL: 2.3 G/DL
ALP SERPL-CCNC: 75 U/L (ref 30–99)
ALT SERPL-CCNC: 18 U/L (ref 2–50)
ANION GAP SERPL CALC-SCNC: 7 MMOL/L (ref 0–11.9)
AST SERPL-CCNC: 26 U/L (ref 12–45)
BASOPHILS # BLD AUTO: 0.6 % (ref 0–1.8)
BASOPHILS # BLD: 0.02 K/UL (ref 0–0.12)
BILIRUB SERPL-MCNC: 0.8 MG/DL (ref 0.1–1.5)
BUN SERPL-MCNC: 20 MG/DL (ref 8–22)
CALCIUM SERPL-MCNC: 9.3 MG/DL (ref 8.5–10.5)
CHLORIDE SERPL-SCNC: 106 MMOL/L (ref 96–112)
CHOLEST SERPL-MCNC: 175 MG/DL (ref 100–199)
CO2 SERPL-SCNC: 28 MMOL/L (ref 20–33)
CREAT SERPL-MCNC: 0.83 MG/DL (ref 0.5–1.4)
EOSINOPHIL # BLD AUTO: 0.04 K/UL (ref 0–0.51)
EOSINOPHIL NFR BLD: 1.3 % (ref 0–6.9)
ERYTHROCYTE [DISTWIDTH] IN BLOOD BY AUTOMATED COUNT: 49.1 FL (ref 35.9–50)
EST. AVERAGE GLUCOSE BLD GHB EST-MCNC: 111 MG/DL
FASTING STATUS PATIENT QL REPORTED: NORMAL
GLOBULIN SER CALC-MCNC: 2 G/DL (ref 1.9–3.5)
GLUCOSE SERPL-MCNC: 97 MG/DL (ref 65–99)
HBA1C MFR BLD: 5.5 % (ref 0–5.6)
HCT VFR BLD AUTO: 45.3 % (ref 37–47)
HDLC SERPL-MCNC: 48 MG/DL
HGB BLD-MCNC: 14.7 G/DL (ref 12–16)
IMM GRANULOCYTES # BLD AUTO: 0.01 K/UL (ref 0–0.11)
IMM GRANULOCYTES NFR BLD AUTO: 0.3 % (ref 0–0.9)
LDLC SERPL CALC-MCNC: 105 MG/DL
LYMPHOCYTES # BLD AUTO: 1.2 K/UL (ref 1–4.8)
LYMPHOCYTES NFR BLD: 37.5 % (ref 22–41)
MCH RBC QN AUTO: 33.2 PG (ref 27–33)
MCHC RBC AUTO-ENTMCNC: 32.5 G/DL (ref 33.6–35)
MCV RBC AUTO: 102.3 FL (ref 81.4–97.8)
MONOCYTES # BLD AUTO: 0.36 K/UL (ref 0–0.85)
MONOCYTES NFR BLD AUTO: 11.3 % (ref 0–13.4)
NEUTROPHILS # BLD AUTO: 1.57 K/UL (ref 2–7.15)
NEUTROPHILS NFR BLD: 49 % (ref 44–72)
NRBC # BLD AUTO: 0 K/UL
NRBC BLD-RTO: 0 /100 WBC
PLATELET # BLD AUTO: 242 K/UL (ref 164–446)
PMV BLD AUTO: 10.8 FL (ref 9–12.9)
POTASSIUM SERPL-SCNC: 3.7 MMOL/L (ref 3.6–5.5)
PROT SERPL-MCNC: 6.5 G/DL (ref 6–8.2)
RBC # BLD AUTO: 4.43 M/UL (ref 4.2–5.4)
SODIUM SERPL-SCNC: 141 MMOL/L (ref 135–145)
TRIGL SERPL-MCNC: 110 MG/DL (ref 0–149)
WBC # BLD AUTO: 3.2 K/UL (ref 4.8–10.8)

## 2019-11-12 PROCEDURE — 36415 COLL VENOUS BLD VENIPUNCTURE: CPT

## 2019-11-12 PROCEDURE — 82306 VITAMIN D 25 HYDROXY: CPT

## 2019-11-12 PROCEDURE — 80053 COMPREHEN METABOLIC PANEL: CPT

## 2019-11-12 PROCEDURE — 85025 COMPLETE CBC W/AUTO DIFF WBC: CPT

## 2019-11-12 PROCEDURE — 83036 HEMOGLOBIN GLYCOSYLATED A1C: CPT

## 2019-11-12 PROCEDURE — 80061 LIPID PANEL: CPT

## 2019-11-13 ENCOUNTER — APPOINTMENT (RX ONLY)
Dept: URBAN - METROPOLITAN AREA CLINIC 4 | Facility: CLINIC | Age: 74
Setting detail: DERMATOLOGY
End: 2019-11-13

## 2019-11-13 DIAGNOSIS — D22 MELANOCYTIC NEVI: ICD-10-CM

## 2019-11-13 DIAGNOSIS — L81.4 OTHER MELANIN HYPERPIGMENTATION: ICD-10-CM

## 2019-11-13 DIAGNOSIS — D485 NEOPLASM OF UNCERTAIN BEHAVIOR OF SKIN: ICD-10-CM

## 2019-11-13 DIAGNOSIS — Z85.820 PERSONAL HISTORY OF MALIGNANT MELANOMA OF SKIN: ICD-10-CM

## 2019-11-13 DIAGNOSIS — L57.0 ACTINIC KERATOSIS: ICD-10-CM

## 2019-11-13 DIAGNOSIS — L82.1 OTHER SEBORRHEIC KERATOSIS: ICD-10-CM

## 2019-11-13 DIAGNOSIS — D18.0 HEMANGIOMA: ICD-10-CM

## 2019-11-13 PROBLEM — D22.72 MELANOCYTIC NEVI OF LEFT LOWER LIMB, INCLUDING HIP: Status: ACTIVE | Noted: 2019-11-13

## 2019-11-13 PROBLEM — D22.5 MELANOCYTIC NEVI OF TRUNK: Status: ACTIVE | Noted: 2019-11-13

## 2019-11-13 PROBLEM — D22.61 MELANOCYTIC NEVI OF RIGHT UPPER LIMB, INCLUDING SHOULDER: Status: ACTIVE | Noted: 2019-11-13

## 2019-11-13 PROBLEM — D48.5 NEOPLASM OF UNCERTAIN BEHAVIOR OF SKIN: Status: ACTIVE | Noted: 2019-11-13

## 2019-11-13 PROBLEM — D22.62 MELANOCYTIC NEVI OF LEFT UPPER LIMB, INCLUDING SHOULDER: Status: ACTIVE | Noted: 2019-11-13

## 2019-11-13 PROBLEM — D22.71 MELANOCYTIC NEVI OF RIGHT LOWER LIMB, INCLUDING HIP: Status: ACTIVE | Noted: 2019-11-13

## 2019-11-13 PROBLEM — D18.01 HEMANGIOMA OF SKIN AND SUBCUTANEOUS TISSUE: Status: ACTIVE | Noted: 2019-11-13

## 2019-11-13 PROCEDURE — 17003 DESTRUCT PREMALG LES 2-14: CPT

## 2019-11-13 PROCEDURE — 17000 DESTRUCT PREMALG LESION: CPT | Mod: 59

## 2019-11-13 PROCEDURE — 11102 TANGNTL BX SKIN SINGLE LES: CPT

## 2019-11-13 PROCEDURE — ? BIOPSY BY SHAVE METHOD

## 2019-11-13 PROCEDURE — ? SUNSCREEN RECOMMENDATIONS

## 2019-11-13 PROCEDURE — ? LIQUID NITROGEN

## 2019-11-13 PROCEDURE — ? COUNSELING

## 2019-11-13 PROCEDURE — 99213 OFFICE O/P EST LOW 20 MIN: CPT | Mod: 25

## 2019-11-13 ASSESSMENT — LOCATION SIMPLE DESCRIPTION DERM
LOCATION SIMPLE: CHEST
LOCATION SIMPLE: LEFT POSTERIOR UPPER ARM
LOCATION SIMPLE: LEFT FOREARM
LOCATION SIMPLE: RIGHT POSTERIOR UPPER ARM
LOCATION SIMPLE: LEFT ANTERIOR NECK
LOCATION SIMPLE: RIGHT FOREARM
LOCATION SIMPLE: LEFT CHEEK
LOCATION SIMPLE: ABDOMEN
LOCATION SIMPLE: RIGHT HAND
LOCATION SIMPLE: RIGHT THIGH
LOCATION SIMPLE: RIGHT ANTERIOR NECK
LOCATION SIMPLE: LEFT UPPER BACK
LOCATION SIMPLE: RIGHT LOWER BACK
LOCATION SIMPLE: UPPER BACK
LOCATION SIMPLE: LEFT HAND
LOCATION SIMPLE: LEFT THIGH
LOCATION SIMPLE: RIGHT CHEEK

## 2019-11-13 ASSESSMENT — LOCATION DETAILED DESCRIPTION DERM
LOCATION DETAILED: RIGHT LATERAL SUPERIOR CHEST
LOCATION DETAILED: RIGHT RIB CAGE
LOCATION DETAILED: LEFT INFERIOR ANTERIOR NECK
LOCATION DETAILED: LEFT CENTRAL MALAR CHEEK
LOCATION DETAILED: LEFT ANTERIOR PROXIMAL THIGH
LOCATION DETAILED: RIGHT SUPERIOR MEDIAL MIDBACK
LOCATION DETAILED: RIGHT INFERIOR ANTERIOR NECK
LOCATION DETAILED: LEFT SUPERIOR UPPER BACK
LOCATION DETAILED: SUPERIOR THORACIC SPINE
LOCATION DETAILED: LEFT SUPERIOR MEDIAL UPPER BACK
LOCATION DETAILED: RIGHT DISTAL POSTERIOR UPPER ARM
LOCATION DETAILED: PERIUMBILICAL SKIN
LOCATION DETAILED: RIGHT PROXIMAL DORSAL FOREARM
LOCATION DETAILED: RIGHT ANTERIOR PROXIMAL THIGH
LOCATION DETAILED: RIGHT CENTRAL MALAR CHEEK
LOCATION DETAILED: LEFT LATERAL ABDOMEN
LOCATION DETAILED: RIGHT RADIAL DORSAL HAND
LOCATION DETAILED: LEFT ULNAR DORSAL HAND
LOCATION DETAILED: LEFT PROXIMAL POSTERIOR UPPER ARM
LOCATION DETAILED: LEFT PROXIMAL DORSAL FOREARM

## 2019-11-13 ASSESSMENT — LOCATION ZONE DERM
LOCATION ZONE: TRUNK
LOCATION ZONE: HAND
LOCATION ZONE: FACE
LOCATION ZONE: ARM
LOCATION ZONE: NECK
LOCATION ZONE: LEG

## 2019-11-13 NOTE — PROCEDURE: LIQUID NITROGEN
Render Note In Bullet Format When Appropriate: No
Detail Level: Simple
Post-Care Instructions: I reviewed with the patient in detail post-care instructions. Patient is to wear sunprotection, and avoid picking at any of the treated lesions. Pt may apply Vaseline to crusted or scabbing areas.
Duration Of Freeze Thaw-Cycle (Seconds): 3
Number Of Freeze-Thaw Cycles: 2 freeze-thaw cycles
Consent: The patient's consent was obtained including but not limited to risks of crusting, scabbing, blistering, scarring, darker or lighter pigmentary change, recurrence, incomplete removal and infection.

## 2019-11-13 NOTE — PROCEDURE: COUNSELING
Detail Level: Detailed
Quality 137: Melanoma: Continuity Of Care - Recall System: Recall system not utilized, reason not otherwise specified
When Should The Patient Follow-Up For Their Next Full-Body Skin Exam?: 6 Months
Detail Level: Zone

## 2019-11-13 NOTE — PROCEDURE: BIOPSY BY SHAVE METHOD
Render In Bullet Format When Appropriate: No
Silver Nitrate Text: The wound bed was treated with silver nitrate after the biopsy was performed.
Size Of Lesion In Cm: 0
Anesthesia Type: 1% lidocaine with epinephrine
Wound Care: Vaseline
Electrodesiccation And Curettage Text: The wound bed was treated with electrodesiccation and curettage after the biopsy was performed.
Type Of Destruction Used: Curettage
Depth Of Biopsy: dermis
Consent: Written consent was obtained and risks were reviewed including but not limited to scarring, infection, bleeding, scabbing, incomplete removal, nerve damage and allergy to anesthesia.
Biopsy Type: H and E
Electrodesiccation Text: The wound bed was treated with electrodesiccation after the biopsy was performed.
Notification Instructions: Patient will be notified of biopsy results. However, patient instructed to call the office if not contacted within 2 weeks.
Anesthesia Volume In Cc: 2
Lab Facility: 
Cryotherapy Text: The wound bed was treated with cryotherapy after the biopsy was performed.
Post-Care Instructions: I reviewed with the patient in detail post-care instructions. Patient is to keep the biopsy site dry overnight, and then apply bacitracin twice daily until healed. Patient may apply hydrogen peroxide soaks to remove any crusting.
Was A Bandage Applied: Yes
Dressing: bandage
Lab: [id number]
Biopsy Method: Personna blade
Detail Level: Detailed
Hemostasis: Aluminum Chloride and Electrocautery
Billing Type: Third-Party Bill
Curettage Text: The wound bed was treated with curettage after the biopsy was performed.

## 2019-11-18 ENCOUNTER — TELEPHONE (OUTPATIENT)
Dept: MEDICAL GROUP | Age: 74
End: 2019-11-18

## 2019-11-18 NOTE — TELEPHONE ENCOUNTER
ESTABLISHED PATIENT PRE-VISIT PLANNING     Patient was NOT contacted to complete PVP.     Note: Patient will not be contacted if there is no indication to call.     1.  Reviewed notes from the last few office visits within the medical group: Yes    2.  If any orders were placed at last visit or intended to be done for this visit (i.e. 6 mos follow-up), do we have Results/Consult Notes?        •  Labs - Labs ordered, completed on 11/12/19 and results are in chart.   Note: If patient appointment is for lab review and patient did not complete labs, check with provider if OK to reschedule patient until labs completed.       •  Imaging - Imaging ordered, completed and results are in chart.       •  Referrals - No referrals were ordered at last office visit.    3. Is this appointment scheduled as a Hospital Follow-Up? No    4.  Immunizations were updated in Epic using WebIZ?: Epic matches WebIZ       •  Web Iz Recommendations: Patient is up to date on all vaccines    5.  Patient is due for the following Health Maintenance Topics:   Health Maintenance Due   Topic Date Due   • HEPATITIS C SCREENING  1945   • Annual Wellness Visit  01/04/2019   • BONE DENSITY  12/12/2019           6. Orders for overdue Health Maintenance topics pended in Pre-Charting? N\A    7.  AHA (MDX) form printed for Provider? No, already completed    8.  Patient was NOT informed to arrive 15 min prior to their scheduled appointment and bring in their medication bottles.

## 2019-11-19 ENCOUNTER — OFFICE VISIT (OUTPATIENT)
Dept: MEDICAL GROUP | Age: 74
End: 2019-11-19
Payer: MEDICARE

## 2019-11-19 VITALS
HEART RATE: 74 BPM | TEMPERATURE: 98.8 F | WEIGHT: 126.4 LBS | SYSTOLIC BLOOD PRESSURE: 128 MMHG | BODY MASS INDEX: 21.06 KG/M2 | DIASTOLIC BLOOD PRESSURE: 82 MMHG | HEIGHT: 65 IN | OXYGEN SATURATION: 99 %

## 2019-11-19 DIAGNOSIS — Z11.59 NEED FOR HEPATITIS C SCREENING TEST: ICD-10-CM

## 2019-11-19 DIAGNOSIS — E78.5 HYPERLIPIDEMIA WITH TARGET LDL LESS THAN 130: ICD-10-CM

## 2019-11-19 DIAGNOSIS — M85.80 OSTEOPENIA WITH HIGH RISK OF FRACTURE: ICD-10-CM

## 2019-11-19 DIAGNOSIS — F03.90 DEMENTIA WITHOUT BEHAVIORAL DISTURBANCE, UNSPECIFIED DEMENTIA TYPE: ICD-10-CM

## 2019-11-19 PROBLEM — M25.462 PAIN AND SWELLING OF KNEE, LEFT: Status: RESOLVED | Noted: 2018-09-10 | Resolved: 2019-11-19

## 2019-11-19 PROBLEM — M25.562 PAIN AND SWELLING OF KNEE, LEFT: Status: RESOLVED | Noted: 2018-09-10 | Resolved: 2019-11-19

## 2019-11-19 PROCEDURE — 99214 OFFICE O/P EST MOD 30 MIN: CPT | Performed by: INTERNAL MEDICINE

## 2019-11-19 RX ORDER — OXYCODONE AND ACETAMINOPHEN 10; 325 MG/1; MG/1
TABLET ORAL
COMMUNITY
End: 2019-11-19

## 2019-11-19 RX ORDER — PRAVASTATIN SODIUM 40 MG
40 TABLET ORAL EVERY EVENING
Qty: 100 TAB | Refills: 3 | Status: SHIPPED | OUTPATIENT
Start: 2019-11-19 | End: 2020-08-11 | Stop reason: SDUPTHER

## 2019-11-19 SDOH — HEALTH STABILITY: MENTAL HEALTH: HOW OFTEN DO YOU HAVE 6 OR MORE DRINKS ON ONE OCCASION?: NEVER

## 2019-11-19 SDOH — HEALTH STABILITY: MENTAL HEALTH: HOW OFTEN DO YOU HAVE A DRINK CONTAINING ALCOHOL?: 2-4 TIMES A MONTH

## 2019-11-19 SDOH — HEALTH STABILITY: MENTAL HEALTH: HOW MANY STANDARD DRINKS CONTAINING ALCOHOL DO YOU HAVE ON A TYPICAL DAY?: 1 OR 2

## 2019-11-19 ASSESSMENT — PAIN SCALES - GENERAL: PAINLEVEL: NO PAIN

## 2019-11-19 NOTE — PROGRESS NOTES
Subjective:   Lia Da Silva is a 74 y.o. female here today for evaluation and management of:    Hyperlipidemia with target LDL less than 130  Chronic. Patient reports compliancy with pravastatin 20 mg QN and denies any associated side effects. Today we reviewed blood work from 11/12/19 which remained elevated with tot 175; tri 110; HDL 48; . The 10-year ASCVD risk score (Marcuslilia TYSON Jr., et al., 2013) is: 14.3%. She denies any chest pain or claudication. Today she is agreeable to increasing dose to pravastatin 40 mg QN.    I reviewed recent blood work with the patient in clinic today.   Ref. Range 4/15/2019 06:40 11/12/2019 06:30   Cholesterol,Tot Latest Ref Range: 100 - 199 mg/dL 157 175   Triglycerides Latest Ref Range: 0 - 149 mg/dL 56 110   HDL Latest Ref Range: >=40 mg/dL 41 48   LDL Latest Ref Range: <100 mg/dL 105 (H) 105 (H)     Dementia without behavioral disturbance, unspecified dementia type (HCC)  Chronic. Patient reports compliancy with donepezil 10 mg QN and memantine 10 mg BID and denies any associated side effects. She was last seen by Dr. Jennifer Gomes (Neurology) on 10/28/19, at which time memantine was increased from 5 mg QD to 10 mg BID. Patient reports she is still able to function well, however previous notes indicate that family has noticed gradual memory loss including forgetting card games, difficulty balancing checkbook, and impaired visual spatial acuity. She lives with her  who assists her, however he did not accompany her to today's appointment.    Osteopenia with high risk of fracture  Chronic. Patient reports compliancy with cholecalciferol 2000 IU QD and over the counter calcium and denies any associated side effects. She regularly walks and does yoga. Most recent DEXA scan was completed 12/12/17 and indicated decreased bone density in lumbar spine (T score -1.0), increased density in left femur (T score -2.1), and 10-year probability of fracture 12.5%. She denies  "history of fractures or recent injury. No abnormal hip pain or back pain reported. She prefers to continue to control with vitamins and exercises rather than prescription medication.     Current medicines (including changes today)  Current Outpatient Medications   Medication Sig Dispense Refill   • pravastatin (PRAVACHOL) 40 MG tablet Take 1 Tab by mouth every evening. 100 Tab 3   • donepezil (ARICEPT) 10 MG tablet Take 1 Tab by mouth every evening. 90 Tab 3   • memantine (NAMENDA) 10 MG Tab Take 1 Tab by mouth 2 times a day. TAKE 1 TAB BY MOUTH 2 TIMES A DAY. 180 Tab 1   • Cholecalciferol (VITAMIN D) 2000 units Cap Take 1 Cap by mouth every day. 30 Cap 0   • Omega-3 Fatty Acids (FISH OIL) 1200 MG Cap Take  by mouth every day.     • GLUCOSAMINE HCL-MSM PO Take 1,500 mg by mouth every day.     • Multiple Vitamins-Minerals (MULTI FOR HER 50+) Tab Take  by mouth.     • Probiotic Product (PRO-BIOTIC BLEND) Cap Take  by mouth.     • aspirin EC (ECOTRIN) 81 MG TBEC Take 81 mg by mouth every day.         No current facility-administered medications for this visit.      She  has a past medical history of Basal cell carcinoma of skin, Cancer (HCC) (2016, 2017), High cholesterol, Hyperlipidemia LDL goal < 130 (3/21/2012), Leukopenia (3/21/2012), and Melanoma of back (HCC) (6/2016).    ROS   No chest pain, no shortness of breath, no abdominal pain       Objective:     /82 (BP Location: Left arm, Patient Position: Sitting, BP Cuff Size: Adult)   Pulse 74   Temp 37.1 °C (98.8 °F) (Temporal)   Ht 1.647 m (5' 4.84\")   Wt 57.3 kg (126 lb 6.4 oz)   SpO2 99%  Body mass index is 21.14 kg/m².   Physical Exam:  General: Alert, oriented and no acute distress.  Eye contact is good, speech goal directed, affect calm  HEENT: conjunctiva non-injected, sclera non-icteric.  Oral mucous membranes pink and moist with no lesions.  Pinna normal.   Lungs: Normal respiratory effort, clear to auscultation bilaterally with good " excursion.  CV: regular rate and rhythm. No murmurs.   Abdomen: soft, non distended, nontender, Bowel sound normal.  Ext: no edema, color normal, vascularity normal, temperature normal  Musculoskeletal exam: moving all extremities freely      Assessment and Plan:   The following treatment plan was discussed:    1. Hyperlipidemia with target LDL less than 130  - Not at goal. Lipid panel from 11/12/19 elevated with tot 175; tri 110; HDL 48; . The 10-year ASCVD risk score ([place] JAH Jr., et al., 2013) is: 14.3%  - Patient is agreeable to increasing pravastatin dose from 20 mg QN to 40 mg QN. I reviewed potential risks, benefits, and side effects of this medication with the patient in clinic today. No prior side effects reported. If she develops side effects, she is advised to decrease back to 20 mg QN dosing and return to clinic for evaluation.  - Advised to eat low fat, low carbohydrate and high fiber diet as well as do cardio physical exercise regularly.   - Plan to continue to monitor with blood work, which I have ordered and will be reviewed by their new PCP.  - pravastatin (PRAVACHOL) 40 MG tablet; Take 1 Tab by mouth every evening.  Dispense: 100 Tab; Refill: 3  - Comp Metabolic Panel; Future  - Lipid Profile; Future    2. Dementia without behavioral disturbance, unspecified dementia type (HCC)  - Chronic and stable. Patient advised to continue current regimen, donepezil 10 mg QN and memantine 10 mg BID as managed by Neurology. I reviewed potential risks, benefits, and side effects of this medication with the patient in clinic today.  - Patient is encouraged to continue to follow with Neurology regularly.   - Patient encouraged to continue home brain exercises. Continue taking multivitamins daily.  - Plan to continue to monitor with blood work, which I have ordered and will be reviewed by their new PCP.  - CBC WITH DIFFERENTIAL; Future  - Comp Metabolic Panel; Future    3. Osteopenia with high risk of  fracture  - Discussed bone density scan from 12/12/17 which indicated decreased bone density in lumbar spine (T score -1.0), increased density in left femur (T score -2.1), and 10-year probability of fracture 12.5%.   - Patient is currently attempting to manage with cholecalciferol 2000 IU QD and over the counter calcium, declining prescription medication. I reviewed potential risks, benefits, and side effects of this medication with the patient in clinic today.  - Advised to do weight bearing exercises and walking regularly.   - Patient may consider repeat DEXA, which is due 12/2019, which she should discuss with her new PCP at her next follow up appointment.    4. Need for hepatitis C screening test  - Patient is due for Hep C screening test which patient was agreeable to, orders placed today.   - HEP C VIRUS ANTIBODY; Future    5. Health Maintenance   - Patient will be due for repeat DEXA 12/2019, which she should discuss and have ordered by her new PCP.  - Patient is due for AWV, which she is encouraged to schedule with her new PCP.      Follow up: Return in about 3 months (around 2/19/2020), or if symptoms worsen or fail to improve, for Hyperlipidemia, dementia, osteopenia, leukopenia, Lab review.    I, Dinorah Lake (Gautam), am scribing for, and in the presence of, Gayle Graham M.D.. Electronically signed by: Dinorah Lake (Gautam), 11/19/2019.  ?  I, Gayle Graham M.D., personally performed the services described in this documentation, as scribed by Dinorah Lake in my presence, and it is both accurate and complete.    Please note that this dictation was created using voice recognition software. I have made every reasonable attempt to correct obvious errors, but I expect that there may have unintended errors in text, spelling, punctuation, or grammar that I did not discover.

## 2019-11-19 NOTE — PATIENT INSTRUCTIONS

## 2019-11-20 NOTE — PROGRESS NOTES
1. [facility] Verified: yes    2. Verify PCP: yes    3. Review and add  to Care Team: yes    5. Reviewed/Updated the following with patient:       •   Communication Preference Obtained? YES  • MyChart Activation: already active       •   E-Mail Address Obtained? YES       •   Appointment Day and Time Preferences? YES       •   Preferred Pharmacy? YES       •   Preferred Lab? YES

## 2019-11-21 ENCOUNTER — RX ONLY (OUTPATIENT)
Age: 74
Setting detail: RX ONLY
End: 2019-11-21

## 2019-11-21 RX ORDER — TRIAMCINOLONE ACETONIDE 1 MG/G
1 CREAM TOPICAL BID
Qty: 1 | Refills: 1 | Status: ERX | COMMUNITY
Start: 2019-11-21

## 2019-12-02 ENCOUNTER — PATIENT MESSAGE (OUTPATIENT)
Dept: MEDICAL GROUP | Age: 74
End: 2019-12-02

## 2019-12-02 DIAGNOSIS — M85.80 OSTEOPENIA WITH HIGH RISK OF FRACTURE: ICD-10-CM

## 2019-12-02 DIAGNOSIS — Z78.0 POSTMENOPAUSAL ESTROGEN DEFICIENCY: ICD-10-CM

## 2019-12-03 RX ORDER — DONEPEZIL HYDROCHLORIDE 5 MG/1
TABLET, FILM COATED ORAL
Qty: 90 TAB | Refills: 2 | Status: SHIPPED | OUTPATIENT
Start: 2019-12-03 | End: 2020-02-21

## 2019-12-03 NOTE — TELEPHONE ENCOUNTER
From: Lia Da Silva  To: Gayle Graham M.D.  Sent: 12/2/2019 12:51 PM PST  Subject: Non-Urgent Medical Question    Do I need you to schedule a bone density test through you?    Lia Da Silva

## 2019-12-03 NOTE — PATIENT COMMUNICATION
Patient requested to order DEXA scan.  The test was ordered today.  Please see patient's message on 12/2/2019 for discussion.    Gayle Graham M.D.

## 2019-12-05 ENCOUNTER — HOSPITAL ENCOUNTER (OUTPATIENT)
Dept: RADIOLOGY | Facility: MEDICAL CENTER | Age: 74
End: 2019-12-05
Attending: INTERNAL MEDICINE
Payer: MEDICARE

## 2019-12-05 DIAGNOSIS — M85.80 OSTEOPENIA WITH HIGH RISK OF FRACTURE: ICD-10-CM

## 2019-12-05 DIAGNOSIS — Z78.0 POSTMENOPAUSAL ESTROGEN DEFICIENCY: ICD-10-CM

## 2019-12-05 PROCEDURE — 77080 DXA BONE DENSITY AXIAL: CPT

## 2020-02-06 ENCOUNTER — TELEPHONE (OUTPATIENT)
Dept: MEDICAL GROUP | Age: 75
End: 2020-02-06

## 2020-02-06 NOTE — TELEPHONE ENCOUNTER
ESTABLISHED PATIENT PRE-VISIT PLANNING     Patient was contacted to complete PVP.     Note: Patient will not be contacted if there is no indication to call.     1.  Reviewed notes from the last few office visits within the medical group: Yes    2.  If any orders were placed at last visit or intended to be done for this visit (i.e. 6 mos follow-up), do we have Results/Consult Notes?        •  Labs - Labs ordered, NOT completed. Patient advised to complete prior to next appointment.   Note: If patient appointment is for lab review and patient did not complete labs, check with provider if OK to reschedule patient until labs completed.       •  Imaging - Imaging ordered, completed and results are in chart.       •  Referrals - No referrals were ordered at last office visit.    3. Is this appointment scheduled as a Hospital Follow-Up? No    4.  Immunizations were updated in Epic using WebIZ?: Epic matches WebIZ       •  Web Iz Recommendations: Patient is up to date on all vaccines    5.  Patient is due for the following Health Maintenance Topics:   Health Maintenance Due   Topic Date Due   • HEPATITIS C SCREENING  1945   • Annual Wellness Visit  01/04/2019     6. Orders for overdue Health Maintenance topics pended in Pre-Charting? N\A    7.  AHA (MDX) form printed for Provider? YES    8.  Patient was informed to arrive 15 min prior to their scheduled appointment and bring in their medication bottles.

## 2020-02-18 ENCOUNTER — HOSPITAL ENCOUNTER (OUTPATIENT)
Dept: LAB | Facility: MEDICAL CENTER | Age: 75
End: 2020-02-18
Attending: INTERNAL MEDICINE
Payer: MEDICARE

## 2020-02-18 DIAGNOSIS — F03.90 DEMENTIA WITHOUT BEHAVIORAL DISTURBANCE, UNSPECIFIED DEMENTIA TYPE: ICD-10-CM

## 2020-02-18 DIAGNOSIS — E78.5 HYPERLIPIDEMIA WITH TARGET LDL LESS THAN 130: ICD-10-CM

## 2020-02-18 DIAGNOSIS — Z11.59 NEED FOR HEPATITIS C SCREENING TEST: ICD-10-CM

## 2020-02-18 LAB
BASOPHILS # BLD AUTO: 0.7 % (ref 0–1.8)
BASOPHILS # BLD: 0.03 K/UL (ref 0–0.12)
EOSINOPHIL # BLD AUTO: 0.05 K/UL (ref 0–0.51)
EOSINOPHIL NFR BLD: 1.1 % (ref 0–6.9)
ERYTHROCYTE [DISTWIDTH] IN BLOOD BY AUTOMATED COUNT: 48.4 FL (ref 35.9–50)
HCT VFR BLD AUTO: 47.9 % (ref 37–47)
HGB BLD-MCNC: 15.3 G/DL (ref 12–16)
IMM GRANULOCYTES # BLD AUTO: 0.01 K/UL (ref 0–0.11)
IMM GRANULOCYTES NFR BLD AUTO: 0.2 % (ref 0–0.9)
LYMPHOCYTES # BLD AUTO: 1.18 K/UL (ref 1–4.8)
LYMPHOCYTES NFR BLD: 25.9 % (ref 22–41)
MCH RBC QN AUTO: 32.8 PG (ref 27–33)
MCHC RBC AUTO-ENTMCNC: 31.9 G/DL (ref 33.6–35)
MCV RBC AUTO: 102.6 FL (ref 81.4–97.8)
MONOCYTES # BLD AUTO: 0.48 K/UL (ref 0–0.85)
MONOCYTES NFR BLD AUTO: 10.5 % (ref 0–13.4)
NEUTROPHILS # BLD AUTO: 2.81 K/UL (ref 2–7.15)
NEUTROPHILS NFR BLD: 61.6 % (ref 44–72)
NRBC # BLD AUTO: 0 K/UL
NRBC BLD-RTO: 0 /100 WBC
PLATELET # BLD AUTO: 272 K/UL (ref 164–446)
PMV BLD AUTO: 10.8 FL (ref 9–12.9)
RBC # BLD AUTO: 4.67 M/UL (ref 4.2–5.4)
WBC # BLD AUTO: 4.6 K/UL (ref 4.8–10.8)

## 2020-02-18 PROCEDURE — 36415 COLL VENOUS BLD VENIPUNCTURE: CPT

## 2020-02-18 PROCEDURE — 85025 COMPLETE CBC W/AUTO DIFF WBC: CPT

## 2020-02-18 PROCEDURE — 80061 LIPID PANEL: CPT

## 2020-02-18 PROCEDURE — 86803 HEPATITIS C AB TEST: CPT

## 2020-02-18 PROCEDURE — 80053 COMPREHEN METABOLIC PANEL: CPT

## 2020-02-19 ENCOUNTER — HOSPITAL ENCOUNTER (OUTPATIENT)
Dept: LAB | Facility: MEDICAL CENTER | Age: 75
End: 2020-02-19
Attending: CLINICAL NURSE SPECIALIST
Payer: MEDICARE

## 2020-02-19 LAB
ALBUMIN SERPL BCP-MCNC: 4.5 G/DL (ref 3.2–4.9)
ALBUMIN/GLOB SERPL: 1.7 G/DL
ALP SERPL-CCNC: 68 U/L (ref 30–99)
ALT SERPL-CCNC: 12 U/L (ref 2–50)
ANION GAP SERPL CALC-SCNC: 5 MMOL/L (ref 0–11.9)
AST SERPL-CCNC: 21 U/L (ref 12–45)
BILIRUB SERPL-MCNC: 0.7 MG/DL (ref 0.1–1.5)
BUN SERPL-MCNC: 10 MG/DL (ref 8–22)
CALCIUM SERPL-MCNC: 9.3 MG/DL (ref 8.5–10.5)
CHLORIDE SERPL-SCNC: 105 MMOL/L (ref 96–112)
CHOLEST SERPL-MCNC: 173 MG/DL (ref 100–199)
CO2 SERPL-SCNC: 29 MMOL/L (ref 20–33)
CREAT SERPL-MCNC: 0.81 MG/DL (ref 0.5–1.4)
FASTING STATUS PATIENT QL REPORTED: NORMAL
GLOBULIN SER CALC-MCNC: 2.6 G/DL (ref 1.9–3.5)
GLUCOSE SERPL-MCNC: 93 MG/DL (ref 65–99)
HCV AB SER QL: NEGATIVE
HDLC SERPL-MCNC: 38 MG/DL
LDLC SERPL CALC-MCNC: 120 MG/DL
POTASSIUM SERPL-SCNC: 3.9 MMOL/L (ref 3.6–5.5)
PROT SERPL-MCNC: 7.1 G/DL (ref 6–8.2)
SODIUM SERPL-SCNC: 139 MMOL/L (ref 135–145)
TRIGL SERPL-MCNC: 77 MG/DL (ref 0–149)

## 2020-02-19 PROCEDURE — 81401 MOPATH PROCEDURE LEVEL 2: CPT

## 2020-02-19 PROCEDURE — 36415 COLL VENOUS BLD VENIPUNCTURE: CPT

## 2020-02-21 ENCOUNTER — OFFICE VISIT (OUTPATIENT)
Dept: MEDICAL GROUP | Age: 75
End: 2020-02-21
Payer: MEDICARE

## 2020-02-21 VITALS
DIASTOLIC BLOOD PRESSURE: 82 MMHG | HEART RATE: 73 BPM | TEMPERATURE: 99.1 F | HEIGHT: 65 IN | SYSTOLIC BLOOD PRESSURE: 134 MMHG | OXYGEN SATURATION: 96 % | WEIGHT: 124 LBS | BODY MASS INDEX: 20.66 KG/M2

## 2020-02-21 DIAGNOSIS — F03.90 DEMENTIA WITHOUT BEHAVIORAL DISTURBANCE, UNSPECIFIED DEMENTIA TYPE: ICD-10-CM

## 2020-02-21 DIAGNOSIS — D70.8 OTHER NEUTROPENIA (HCC): ICD-10-CM

## 2020-02-21 DIAGNOSIS — R03.0 ELEVATED BLOOD PRESSURE READING: ICD-10-CM

## 2020-02-21 DIAGNOSIS — E55.9 VITAMIN D DEFICIENCY: ICD-10-CM

## 2020-02-21 DIAGNOSIS — E78.5 HYPERLIPIDEMIA WITH TARGET LDL LESS THAN 130: ICD-10-CM

## 2020-02-21 DIAGNOSIS — M85.80 OSTEOPENIA WITH HIGH RISK OF FRACTURE: ICD-10-CM

## 2020-02-21 PROCEDURE — 8041 PR SCP AHA: Performed by: INTERNAL MEDICINE

## 2020-02-21 PROCEDURE — 99214 OFFICE O/P EST MOD 30 MIN: CPT | Performed by: INTERNAL MEDICINE

## 2020-02-21 ASSESSMENT — ENCOUNTER SYMPTOMS
EYES NEGATIVE: 1
PSYCHIATRIC NEGATIVE: 1
CARDIOVASCULAR NEGATIVE: 1
GASTROINTESTINAL NEGATIVE: 1
NEUROLOGICAL NEGATIVE: 1
CONSTITUTIONAL NEGATIVE: 1
MUSCULOSKELETAL NEGATIVE: 1
RESPIRATORY NEGATIVE: 1

## 2020-02-21 ASSESSMENT — PATIENT HEALTH QUESTIONNAIRE - PHQ9: CLINICAL INTERPRETATION OF PHQ2 SCORE: 0

## 2020-02-21 NOTE — PROGRESS NOTES
Subjective:      Olga Lake is a 74 y.o. female who presents with [facility] (New Patient)        HPI    The patient is here for followup of chronic medical problems listed below. The patient is compliant with medications and having no side effects from them. Denies chest pain, abdominal pain, dyspnea, myalgias, or cough.    This is a former patient of Dr. Gayle Graham.       1. Hyperlipidemia with target LDL less than 130    The patient has a chronic history of hyperlipidemia. Currently taking pravastatin 40 mg tab nightly and Aspirin 81 mg daily. No medication side effects were reported including myalgias or abdominal pain. No acute complaints of dizziness, claudication or chest pain. I reviewed recent blood work with the patient in clinic today.     Results for OLGA LAKE (MRN [id number]) as of 2/21/2020 07:38   Ref. Range 11/12/2019 06:30 2/18/2020 06:07   Cholesterol,Tot Latest Ref Range: 100 - 199 mg/dL 175 173   Triglycerides Latest Ref Range: 0 - 149 mg/dL 110 77   HDL Latest Ref Range: >=40 mg/dL 48 38 (A)   LDL Latest Ref Range: <100 mg/dL 105 (H) 120 (H)       2. Other neutropenia (HCC)    The patient has recent history of neutropenia. I reviewed recent blood work with the patient in clinic today.     Results for OLGA LAKE (MRN [id number]) as of 2/21/2020 07:38   Ref. Range 11/12/2019 06:30 2/18/2020 06:07   Neutrophils-Polys Latest Ref Range: 44.00 - 72.00 % 49.00 61.60   Neutrophils (Absolute) Latest Ref Range: 2.00 - 7.15 K/uL 1.57 (L) 2.81       3. Osteopenia with high risk of fracture    Chronic history. Patient is compliant with cholecalciferol 2000 units daily and over the counter calcium supplement. She denies any side effects. She says she regularly walks and does yoga. Most recent DEXA was completed 12/12/17, and indicated decreased bone density in lumbar spine (T score -1.0), increased density in left femur (Tscore -2.1), and 10- year probability of fracture of 12.5%. She  denies history of fractures or recent fracture. No abdominal pain or back pain reported.     4. Vitamin D deficiency    Patient has a chronic history of vitamin D deficiency and is taking cholecalciferol 2000 units daily. She denies any myalgias, muscle weakness, muscle fatigue, extreme thirst, increased urinary frequency, nausea, vomiting or constipation. I reviewed recent blood work with the patient in clinic today.     Results for OLGA LAKE (MRN [id number]) as of 2/21/2020 07:38   Ref. Range 11/12/2019 06:30   25-Hydroxy   Vitamin D 25 Latest Ref Range: 30 - 100 ng/mL 36       5. Elevated blood pressure reading- LOW SALT DIET AND EXERCISE    She has history of elevated blood pressure reading. Currently well controlled with low salt diet and exercise. She says she regularly walks and does yoga. Blood pressure today is 134/82. She denies any related complaints including chest pain, headaches, leg swelling, light-headedness or dizziness.      6. Dementia without behavioral disturbance, unspecified dementia type (HCC)    Chronic history. She is compliant with prescribed memantine 10 mg tab twice daily. She reports she stopped prescribed donepezil 10 mg QN, secondary to side effect of feeling jittery. She is followed by Dr. Jennifer Gomes (Neurology). She reports she is feeling well, however previous notes indicate family members noticed gradual memory loss, including forgetting card games, difficulty balancing checkbook, and impaired visual spatial acuity. She lives with her  who assists her, whoever he did not accompany her to today's visit.       She says she has received her Influenza vaccination this season.        Patient Active Problem List   Diagnosis   • Leukopenia   • Hyperlipidemia with target LDL less than 130   • Osteopenia with high risk of fracture   • Preventative health care   • H/O basal cell carcinoma excision   • H/O melanoma excision   • Elevated blood pressure reading   • Dementia  "([facility])   • Vitamin D deficiency       Outpatient Medications Prior to Visit   Medication Sig Dispense Refill   • pravastatin (PRAVACHOL) 40 MG tablet Take 1 Tab by mouth every evening. 100 Tab 3   • memantine (NAMENDA) 10 MG Tab Take 1 Tab by mouth 2 times a day. TAKE 1 TAB BY MOUTH 2 TIMES A DAY. 180 Tab 1   • Cholecalciferol (VITAMIN D) 2000 units Cap Take 1 Cap by mouth every day. 30 Cap 0   • Omega-3 Fatty Acids (FISH OIL) 1200 MG Cap Take  by mouth every day.     • GLUCOSAMINE HCL-MSM PO Take 1,500 mg by mouth every day.     • Multiple Vitamins-Minerals (MULTI FOR HER 50+) Tab Take  by mouth.     • Probiotic Product (PRO-BIOTIC BLEND) Cap Take  by mouth.     • aspirin EC (ECOTRIN) 81 MG TBEC Take 81 mg by mouth every day.       • donepezil (ARICEPT) 5 MG Tab TAKE 1 TABLET BY MOUTH EVERY DAY 90 Tab 2   • donepezil (ARICEPT) 10 MG tablet Take 1 Tab by mouth every evening. 90 Tab 3     No facility-administered medications prior to visit.         No Known Allergies    Review of Systems   Constitutional: Negative.    HENT: Negative.    Eyes: Negative.    Respiratory: Negative.    Cardiovascular: Negative.    Gastrointestinal: Negative.    Genitourinary: Negative.    Musculoskeletal: Negative.    Skin: Negative.    Neurological: Negative.    Endo/Heme/Allergies: Negative.    Psychiatric/Behavioral: Negative.    All other systems reviewed and are negative.     Objective:     /82 (BP Location: Left arm, Patient Position: Sitting, BP Cuff Size: Adult)   Pulse 73   Temp 37.3 °C (99.1 °F) (Temporal)   Ht 1.647 m (5' 4.84\")   Wt 56.2 kg (124 lb)   LMP 03/01/2004   SpO2 96%   BMI 20.74 kg/m²     Physical Exam   Constitutional: Oriented to person, place, and time. Appears well-developed and well-nourished. No distress.   Head: Normocephalic and atraumatic.   Right Ear: External ear normal.   Left Ear: External ear normal.   Nose: Nose normal.   Mouth/Throat: Oropharynx is clear and moist. No oropharyngeal " exudate.   Eyes: Pupils are equal, round, and reactive to light. Conjunctivae and EOM are normal. Right eye exhibits no discharge. Left eye exhibits no discharge. No scleral icterus.   Neck: Normal range of motion. Neck supple. No JVD present. No tracheal deviation present. No thyromegaly present.   Cardiovascular: Normal rate, regular rhythm, normal heart sounds and intact distal pulses.  Exam reveals no gallop and no friction rub.    No murmur heard.  Pulmonary/Chest: Effort normal. No stridor. No respiratory distress. No wheezing or rales. No tenderness.   Abdominal: Soft. Bowel sounds are normal. No distension and no mass. There is no tenderness. There is no rebound and no guarding. No hernia.   Musculoskeletal: Normal range of motion No edema or tenderness.   Lymphadenopathy: No cervical adenopathy.   Neurological: Alert and oriented to person, place, and time. Normal reflexes. Normal reflexes. No cranial nerve deficit. Normal muscle tone. Coordination normal.   Skin: Skin is warm and dry. No rash noted. Not diaphoretic. No erythema. No pallor.   Psychiatric: Normal mood and affect. Behavior is normal. Judgment and thought content normal.   Nursing note and vitals reviewed.      Lab Results   Component Value Date/Time    HBA1C 5.5 11/12/2019 06:30 AM     Lab Results   Component Value Date/Time    SODIUM 139 02/18/2020 06:07 AM    POTASSIUM 3.9 02/18/2020 06:07 AM    CHLORIDE 105 02/18/2020 06:07 AM    CO2 29 02/18/2020 06:07 AM    GLUCOSE 93 02/18/2020 06:07 AM    BUN 10 02/18/2020 06:07 AM    CREATININE 0.81 02/18/2020 06:07 AM    ALKPHOSPHAT 68 02/18/2020 06:07 AM    ASTSGOT 21 02/18/2020 06:07 AM    ALTSGPT 12 02/18/2020 06:07 AM    TBILIRUBIN 0.7 02/18/2020 06:07 AM     No results found for: INR  Lab Results   Component Value Date/Time    CHOLSTRLTOT 173 02/18/2020 06:07 AM     (H) 02/18/2020 06:07 AM    HDL 38 (A) 02/18/2020 06:07 AM    TRIGLYCERIDE 77 02/18/2020 06:07 AM       No results found for:  TESTOSTERONE  No results found for: TSH  Lab Results   Component Value Date/Time    FREET4 0.77 07/12/2016 06:37 AM     Lab Results   Component Value Date/Time    URICACID 2.5 01/30/2012 02:40 PM     No components found for: VITB12  Lab Results   Component Value Date/Time    25HYDROXY 36 11/12/2019 06:30 AM    25HYDROXY 55 10/30/2018 07:10 AM          Assessment/Plan:     1. Hyperlipidemia with target LDL less than 130    Chronic History. Well-controlled. Continue current regimen of pravastatin 40 mg tab nightly and Aspirin 81 mg daily. Reviewed the risks and benefits of treatment and potential side effects of medication. Recommended they follow low fat, low carbohydrate and high fiber diet. Additionally, patient was asked to exercise regularly including frequent cardio. Recheck lab 1-2 weeks before next follow up visit.    - Comp Metabolic Panel; Future  - Lipid Profile; Future  - CBC WITH DIFFERENTIAL; Future    2. Other neutropenia (HCC)    Under good control. Continue same regimen.     3. Osteopenia with high risk of fracture    Under good control. Continue same regimen of cholecalciferol 2000 units daily and over the counter calcium supplement.     4. Vitamin D deficiency    Under good control. Continue same regimen of cholecalciferol 2000 units daily.     5. Elevated blood pressure reading- LOW SALT DIET AND EXERCISE    Chronic, stable history. Under good control with current medication regimen. Blood pressure today was 134/82. She was encouraged to follow a low sodium diet. Additionally, patient was asked to exercise regularly including frequent cardio.     6. Dementia without behavioral disturbance, unspecified dementia type (HCC)     Under good control. Continue same regimen of memantine 10 mg tab twice daily. She is followed by Dr. Jennifer Gomes (Neurology)     INoel (Scribe), am scribing for, and in the presence of, Marvin Cabrera M.D..    Electronically signed by: Noel Medina  (Scribe), 2/21/2020    IMarvin M.D., personally performed the services described in this documentation, as scribed by Noel Medina in my presence, and it is both accurate and complete.

## 2020-02-25 ENCOUNTER — APPOINTMENT (RX ONLY)
Dept: URBAN - METROPOLITAN AREA CLINIC 4 | Facility: CLINIC | Age: 75
Setting detail: DERMATOLOGY
End: 2020-02-25

## 2020-02-25 DIAGNOSIS — L81.4 OTHER MELANIN HYPERPIGMENTATION: ICD-10-CM

## 2020-02-25 DIAGNOSIS — L57.0 ACTINIC KERATOSIS: ICD-10-CM

## 2020-02-25 DIAGNOSIS — D18.0 HEMANGIOMA: ICD-10-CM

## 2020-02-25 DIAGNOSIS — D22 MELANOCYTIC NEVI: ICD-10-CM

## 2020-02-25 DIAGNOSIS — Z85.820 PERSONAL HISTORY OF MALIGNANT MELANOMA OF SKIN: ICD-10-CM

## 2020-02-25 DIAGNOSIS — Z71.89 OTHER SPECIFIED COUNSELING: ICD-10-CM

## 2020-02-25 DIAGNOSIS — L82.1 OTHER SEBORRHEIC KERATOSIS: ICD-10-CM

## 2020-02-25 PROBLEM — D18.01 HEMANGIOMA OF SKIN AND SUBCUTANEOUS TISSUE: Status: ACTIVE | Noted: 2020-02-25

## 2020-02-25 PROBLEM — D22.5 MELANOCYTIC NEVI OF TRUNK: Status: ACTIVE | Noted: 2020-02-25

## 2020-02-25 PROBLEM — D22.39 MELANOCYTIC NEVI OF OTHER PARTS OF FACE: Status: ACTIVE | Noted: 2020-02-25

## 2020-02-25 LAB — TEST NAME 95000: ABNORMAL

## 2020-02-25 PROCEDURE — 17000 DESTRUCT PREMALG LESION: CPT

## 2020-02-25 PROCEDURE — 99213 OFFICE O/P EST LOW 20 MIN: CPT | Mod: 25

## 2020-02-25 PROCEDURE — 17003 DESTRUCT PREMALG LES 2-14: CPT

## 2020-02-25 PROCEDURE — ? COUNSELING

## 2020-02-25 PROCEDURE — ? LIQUID NITROGEN

## 2020-02-25 ASSESSMENT — LOCATION DETAILED DESCRIPTION DERM
LOCATION DETAILED: RIGHT INFERIOR LATERAL NECK
LOCATION DETAILED: RIGHT INFERIOR LATERAL MALAR CHEEK
LOCATION DETAILED: SUPERIOR THORACIC SPINE
LOCATION DETAILED: RIGHT ANTERIOR DISTAL THIGH
LOCATION DETAILED: RIGHT DISTAL POSTERIOR UPPER ARM
LOCATION DETAILED: LEFT DISTAL DORSAL FOREARM
LOCATION DETAILED: LEFT ANTERIOR DISTAL THIGH
LOCATION DETAILED: RIGHT DISTAL PRETIBIAL REGION
LOCATION DETAILED: RIGHT INFERIOR CENTRAL MALAR CHEEK
LOCATION DETAILED: LEFT DISTAL PRETIBIAL REGION
LOCATION DETAILED: LEFT DISTAL POSTERIOR UPPER ARM
LOCATION DETAILED: LEFT INFERIOR CENTRAL MALAR CHEEK
LOCATION DETAILED: LEFT MEDIAL SUPERIOR CHEST
LOCATION DETAILED: RIGHT MEDIAL SUPERIOR CHEST
LOCATION DETAILED: RIGHT DISTAL RADIAL DORSAL FOREARM
LOCATION DETAILED: LEFT SUPERIOR UPPER BACK
LOCATION DETAILED: NASAL DORSUM

## 2020-02-25 ASSESSMENT — LOCATION SIMPLE DESCRIPTION DERM
LOCATION SIMPLE: RIGHT THIGH
LOCATION SIMPLE: LEFT FOREARM
LOCATION SIMPLE: LEFT PRETIBIAL REGION
LOCATION SIMPLE: NOSE
LOCATION SIMPLE: UPPER BACK
LOCATION SIMPLE: LEFT UPPER BACK
LOCATION SIMPLE: LEFT POSTERIOR UPPER ARM
LOCATION SIMPLE: CHEST
LOCATION SIMPLE: RIGHT FOREARM
LOCATION SIMPLE: RIGHT CHEEK
LOCATION SIMPLE: LEFT THIGH
LOCATION SIMPLE: RIGHT ANTERIOR NECK
LOCATION SIMPLE: RIGHT PRETIBIAL REGION
LOCATION SIMPLE: LEFT CHEEK
LOCATION SIMPLE: RIGHT POSTERIOR UPPER ARM

## 2020-02-25 ASSESSMENT — LOCATION ZONE DERM
LOCATION ZONE: NECK
LOCATION ZONE: ARM
LOCATION ZONE: LEG
LOCATION ZONE: TRUNK
LOCATION ZONE: FACE
LOCATION ZONE: NOSE

## 2020-02-25 NOTE — PROCEDURE: MIPS QUALITY
Quality 226: Preventive Care And Screening: Tobacco Use: Screening And Cessation Intervention: Patient screened for tobacco use and is an ex/non-smoker
Quality 130: Documentation Of Current Medications In The Medical Record: Current Medications Documented
Quality 431: Preventive Care And Screening: Unhealthy Alcohol Use - Screening: Patient screened for unhealthy alcohol use using a single question and scores less than 2 times per year
Quality 111:Pneumonia Vaccination Status For Older Adults: Pneumococcal Vaccination Previously Received
Detail Level: Detailed

## 2020-03-09 RX ORDER — MEMANTINE HYDROCHLORIDE 10 MG/1
TABLET ORAL
Qty: 180 TAB | Refills: 1 | Status: SHIPPED | OUTPATIENT
Start: 2020-03-09 | End: 2020-08-11 | Stop reason: SDUPTHER

## 2020-06-09 ENCOUNTER — HOSPITAL ENCOUNTER (OUTPATIENT)
Dept: RADIOLOGY | Facility: MEDICAL CENTER | Age: 75
End: 2020-06-09
Attending: FAMILY MEDICINE
Payer: MEDICARE

## 2020-06-09 DIAGNOSIS — Z12.31 VISIT FOR SCREENING MAMMOGRAM: ICD-10-CM

## 2020-06-09 PROCEDURE — 77067 SCR MAMMO BI INCL CAD: CPT

## 2020-06-15 NOTE — TELEPHONE ENCOUNTER
Emailed completed form back to pt - Copy to scanning   Pt notified via My Chart     Was the patient seen in the last year in this department? Yes    Does patient have an active prescription for medications requested? Yes  Pt saw dr Gomes   Received Request Via: Pharmacy

## 2020-08-10 ENCOUNTER — HOSPITAL ENCOUNTER (OUTPATIENT)
Dept: LAB | Facility: MEDICAL CENTER | Age: 75
End: 2020-08-10
Attending: INTERNAL MEDICINE
Payer: MEDICARE

## 2020-08-10 DIAGNOSIS — E78.5 HYPERLIPIDEMIA WITH TARGET LDL LESS THAN 130: ICD-10-CM

## 2020-08-10 LAB
ALBUMIN SERPL BCP-MCNC: 4.5 G/DL (ref 3.2–4.9)
ALBUMIN/GLOB SERPL: 2.1 G/DL
ALP SERPL-CCNC: 78 U/L (ref 30–99)
ALT SERPL-CCNC: 13 U/L (ref 2–50)
ANION GAP SERPL CALC-SCNC: 11 MMOL/L (ref 7–16)
AST SERPL-CCNC: 21 U/L (ref 12–45)
BASOPHILS # BLD AUTO: 0.8 % (ref 0–1.8)
BASOPHILS # BLD: 0.03 K/UL (ref 0–0.12)
BILIRUB SERPL-MCNC: 0.5 MG/DL (ref 0.1–1.5)
BUN SERPL-MCNC: 17 MG/DL (ref 8–22)
CALCIUM SERPL-MCNC: 9.5 MG/DL (ref 8.5–10.5)
CHLORIDE SERPL-SCNC: 105 MMOL/L (ref 96–112)
CHOLEST SERPL-MCNC: 156 MG/DL (ref 100–199)
CO2 SERPL-SCNC: 27 MMOL/L (ref 20–33)
CREAT SERPL-MCNC: 0.78 MG/DL (ref 0.5–1.4)
EOSINOPHIL # BLD AUTO: 0.04 K/UL (ref 0–0.51)
EOSINOPHIL NFR BLD: 1.1 % (ref 0–6.9)
ERYTHROCYTE [DISTWIDTH] IN BLOOD BY AUTOMATED COUNT: 47.6 FL (ref 35.9–50)
FASTING STATUS PATIENT QL REPORTED: NORMAL
GLOBULIN SER CALC-MCNC: 2.1 G/DL (ref 1.9–3.5)
GLUCOSE SERPL-MCNC: 105 MG/DL (ref 65–99)
HCT VFR BLD AUTO: 44.6 % (ref 37–47)
HDLC SERPL-MCNC: 42 MG/DL
HGB BLD-MCNC: 14.8 G/DL (ref 12–16)
IMM GRANULOCYTES # BLD AUTO: 0.01 K/UL (ref 0–0.11)
IMM GRANULOCYTES NFR BLD AUTO: 0.3 % (ref 0–0.9)
LDLC SERPL CALC-MCNC: 100 MG/DL
LYMPHOCYTES # BLD AUTO: 0.97 K/UL (ref 1–4.8)
LYMPHOCYTES NFR BLD: 25.8 % (ref 22–41)
MCH RBC QN AUTO: 33.3 PG (ref 27–33)
MCHC RBC AUTO-ENTMCNC: 33.2 G/DL (ref 33.6–35)
MCV RBC AUTO: 100.2 FL (ref 81.4–97.8)
MONOCYTES # BLD AUTO: 0.43 K/UL (ref 0–0.85)
MONOCYTES NFR BLD AUTO: 11.4 % (ref 0–13.4)
NEUTROPHILS # BLD AUTO: 2.28 K/UL (ref 2–7.15)
NEUTROPHILS NFR BLD: 60.6 % (ref 44–72)
NRBC # BLD AUTO: 0 K/UL
NRBC BLD-RTO: 0 /100 WBC
PLATELET # BLD AUTO: 245 K/UL (ref 164–446)
PMV BLD AUTO: 10.6 FL (ref 9–12.9)
POTASSIUM SERPL-SCNC: 4.2 MMOL/L (ref 3.6–5.5)
PROT SERPL-MCNC: 6.6 G/DL (ref 6–8.2)
RBC # BLD AUTO: 4.45 M/UL (ref 4.2–5.4)
SODIUM SERPL-SCNC: 143 MMOL/L (ref 135–145)
TRIGL SERPL-MCNC: 69 MG/DL (ref 0–149)
WBC # BLD AUTO: 3.8 K/UL (ref 4.8–10.8)

## 2020-08-10 PROCEDURE — 80053 COMPREHEN METABOLIC PANEL: CPT

## 2020-08-10 PROCEDURE — 85025 COMPLETE CBC W/AUTO DIFF WBC: CPT

## 2020-08-10 PROCEDURE — 36415 COLL VENOUS BLD VENIPUNCTURE: CPT

## 2020-08-10 PROCEDURE — 80061 LIPID PANEL: CPT

## 2020-08-11 ENCOUNTER — OFFICE VISIT (OUTPATIENT)
Dept: MEDICAL GROUP | Facility: IMAGING CENTER | Age: 75
End: 2020-08-11
Payer: MEDICARE

## 2020-08-11 VITALS
RESPIRATION RATE: 12 BRPM | HEIGHT: 65 IN | HEART RATE: 68 BPM | BODY MASS INDEX: 20.83 KG/M2 | DIASTOLIC BLOOD PRESSURE: 64 MMHG | TEMPERATURE: 98.5 F | WEIGHT: 125 LBS | SYSTOLIC BLOOD PRESSURE: 122 MMHG | OXYGEN SATURATION: 97 %

## 2020-08-11 DIAGNOSIS — D75.89 MACROCYTOSIS: ICD-10-CM

## 2020-08-11 DIAGNOSIS — E78.5 HYPERLIPIDEMIA WITH TARGET LDL LESS THAN 130: ICD-10-CM

## 2020-08-11 DIAGNOSIS — F03.90 DEMENTIA WITHOUT BEHAVIORAL DISTURBANCE, UNSPECIFIED DEMENTIA TYPE: ICD-10-CM

## 2020-08-11 PROCEDURE — 99214 OFFICE O/P EST MOD 30 MIN: CPT | Performed by: FAMILY MEDICINE

## 2020-08-11 RX ORDER — MEMANTINE HYDROCHLORIDE 10 MG/1
10 TABLET ORAL DAILY
Qty: 180 TAB | Refills: 3 | Status: SHIPPED | OUTPATIENT
Start: 2020-08-11

## 2020-08-11 RX ORDER — PRAVASTATIN SODIUM 40 MG
40 TABLET ORAL EVERY EVENING
Qty: 100 TAB | Refills: 3 | Status: SHIPPED | OUTPATIENT
Start: 2020-08-11

## 2020-08-11 ASSESSMENT — ENCOUNTER SYMPTOMS
DIZZINESS: 0
DIARRHEA: 0
PALPITATIONS: 0
COUGH: 0
ABDOMINAL PAIN: 0
DOUBLE VISION: 0
EYE PAIN: 0
CHILLS: 0
NAUSEA: 0
MYALGIAS: 0
HEADACHES: 0
FEVER: 0
VOMITING: 0
SHORTNESS OF BREATH: 0
WHEEZING: 0

## 2020-08-11 ASSESSMENT — FIBROSIS 4 INDEX: FIB4 SCORE: 1.76

## 2020-08-11 ASSESSMENT — PAIN SCALES - GENERAL: PAINLEVEL: NO PAIN

## 2020-08-11 NOTE — PROGRESS NOTES
Chief Complaint   Patient presents with   • Establish Care     HPI:  75yo with dementia, bcc, melanoma, leukopenis, osteopenia, hld, here to establish care. Dr. Graham was her prior pcp moved to another practice. Then she saw Dr. Cabrera and reports that she did not want to go back there.  So she made an appointment with me to obtain refills of her medications before she moved [place].  In review of her labs we did notice that she had a low white count though patient reports that she has had this for as long as she can remember.  Ports that she feels fine no fevers or chills fatigue.  The only concern is her memory.  Which she reports comes and goes.  Allergies   Allergen Reactions   • Aricept [Donepezil]      Nausea and jittery     Current Outpatient Medications   Medication Sig Dispense Refill   • memantine (NAMENDA) 10 MG Tab TAKE 1 TABLET BY MOUTH TWICE A  Tab 1   • pravastatin (PRAVACHOL) 40 MG tablet Take 1 Tab by mouth every evening. 100 Tab 3   • Cholecalciferol (VITAMIN D) 2000 units Cap Take 1 Cap by mouth every day. 30 Cap 0   • Omega-3 Fatty Acids (FISH OIL) 1200 MG Cap Take  by mouth every day.     • Multiple Vitamins-Minerals (MULTI FOR HER 50+) Tab Take  by mouth.     • Probiotic Product (PRO-BIOTIC BLEND) Cap Take  by mouth.     • aspirin EC (ECOTRIN) 81 MG TBEC Take 81 mg by mouth every day.         No current facility-administered medications for this visit.      Social History     Tobacco Use   • Smoking status: Never Smoker   • Smokeless tobacco: Never Used   Substance Use Topics   • Alcohol use: Yes     Alcohol/week: 0.0 oz     Frequency: 2-4 times a month     Drinks per session: 1 or 2     Binge frequency: Never     Comment: one per week, occ   • Drug use: No     Family History   Problem Relation Age of Onset   • Genetic Disorder Father         alzheimers d. 72   • Hyperlipidemia Father    • Hypertension Father    • Cancer Mother         lymphoma d. 86   • Diabetes Brother          "resolved with lifestyle mod       /64   Pulse 68   Temp 36.9 °C (98.5 °F)   Resp 12   Ht 1.651 m (5' 5\")   Wt 56.7 kg (125 lb)   SpO2 97%  Body mass index is 20.8 kg/m².  Review of Systems   Constitutional: Negative for chills, fever and malaise/fatigue.   HENT: Negative for hearing loss and tinnitus.    Eyes: Negative for double vision and pain.   Respiratory: Negative for cough, shortness of breath and wheezing.    Cardiovascular: Negative for chest pain, palpitations and leg swelling.   Gastrointestinal: Negative for abdominal pain, diarrhea, nausea and vomiting.   Genitourinary: Negative for dysuria and frequency.   Musculoskeletal: Negative for joint pain and myalgias.   Skin: Negative for itching and rash.   Neurological: Negative for dizziness and headaches.     Physical Exam   Constitutional: She is oriented to person, place, and time and well-developed, well-nourished, and in no distress. No distress.   HENT:   Head: Normocephalic and atraumatic.   Eyes: Pupils are equal, round, and reactive to light. Conjunctivae are normal. Right eye exhibits no discharge. Left eye exhibits no discharge. No scleral icterus.   Cardiovascular: Normal rate, regular rhythm and normal heart sounds. Exam reveals no gallop and no friction rub.   No murmur heard.  Pulmonary/Chest: Effort normal and breath sounds normal. No respiratory distress. She has no wheezes. She has no rales.   Musculoskeletal:         General: No edema.   Neurological: She is alert and oriented to person, place, and time.   Skin: Skin is warm and dry. She is not diaphoretic.         All labs (last 1 month):   Hospital Outpatient Visit on 08/10/2020   Component Date Value Ref Range Status   • WBC 08/10/2020 3.8* 4.8 - 10.8 K/uL Final   • RBC 08/10/2020 4.45  4.20 - 5.40 M/uL Final   • Hemoglobin 08/10/2020 14.8  12.0 - 16.0 g/dL Final   • Hematocrit 08/10/2020 44.6  37.0 - 47.0 % Final   • MCV 08/10/2020 100.2* 81.4 - 97.8 fL Final   • MCH " 08/10/2020 33.3* 27.0 - 33.0 pg Final   • MCHC 08/10/2020 33.2* 33.6 - 35.0 g/dL Final   • RDW 08/10/2020 47.6  35.9 - 50.0 fL Final   • Platelet Count 08/10/2020 245  164 - 446 K/uL Final   • MPV 08/10/2020 10.6  9.0 - 12.9 fL Final   • Neutrophils-Polys 08/10/2020 60.60  44.00 - 72.00 % Final   • Lymphocytes 08/10/2020 25.80  22.00 - 41.00 % Final   • Monocytes 08/10/2020 11.40  0.00 - 13.40 % Final   • Eosinophils 08/10/2020 1.10  0.00 - 6.90 % Final   • Basophils 08/10/2020 0.80  0.00 - 1.80 % Final   • Immature Granulocytes 08/10/2020 0.30  0.00 - 0.90 % Final   • Nucleated RBC 08/10/2020 0.00  /100 WBC Final   • Neutrophils (Absolute) 08/10/2020 2.28  2.00 - 7.15 K/uL Final    Includes immature neutrophils, if present.   • Lymphs (Absolute) 08/10/2020 0.97* 1.00 - 4.80 K/uL Final   • Monos (Absolute) 08/10/2020 0.43  0.00 - 0.85 K/uL Final   • Eos (Absolute) 08/10/2020 0.04  0.00 - 0.51 K/uL Final   • Baso (Absolute) 08/10/2020 0.03  0.00 - 0.12 K/uL Final   • Immature Granulocytes (abs) 08/10/2020 0.01  0.00 - 0.11 K/uL Final   • NRBC (Absolute) 08/10/2020 0.00  K/uL Final   • Cholesterol,Tot 08/10/2020 156  100 - 199 mg/dL Final   • Triglycerides 08/10/2020 69  0 - 149 mg/dL Final   • HDL 08/10/2020 42  >=40 mg/dL Final   • LDL 08/10/2020 100* <100 mg/dL Final   • Sodium 08/10/2020 143  135 - 145 mmol/L Final   • Potassium 08/10/2020 4.2  3.6 - 5.5 mmol/L Final   • Chloride 08/10/2020 105  96 - 112 mmol/L Final   • Co2 08/10/2020 27  20 - 33 mmol/L Final   • Anion Gap 08/10/2020 11.0  7.0 - 16.0 Final   • Glucose 08/10/2020 105* 65 - 99 mg/dL Final   • Bun 08/10/2020 17  8 - 22 mg/dL Final   • Creatinine 08/10/2020 0.78  0.50 - 1.40 mg/dL Final   • Calcium 08/10/2020 9.5  8.5 - 10.5 mg/dL Final   • AST(SGOT) 08/10/2020 21  12 - 45 U/L Final   • ALT(SGPT) 08/10/2020 13  2 - 50 U/L Final   • Alkaline Phosphatase 08/10/2020 78  30 - 99 U/L Final   • Total Bilirubin 08/10/2020 0.5  0.1 - 1.5 mg/dL Final   •  Albumin 08/10/2020 4.5  3.2 - 4.9 g/dL Final   • Total Protein 08/10/2020 6.6  6.0 - 8.2 g/dL Final   • Globulin 08/10/2020 2.1  1.9 - 3.5 g/dL Final   • A-G Ratio 08/10/2020 2.1  g/dL Final   • Fasting Status 08/10/2020 Fasting   Final   • GFR If  08/10/2020 >60  >60 mL/min/1.73 m 2 Final   • GFR If Non  08/10/2020 >60  >60 mL/min/1.73 m 2 Final       Lipids:   Lab Results   Component Value Date/Time    CHOLSTRLTOT 156 08/10/2020 06:37 AM    TRIGLYCERIDE 69 08/10/2020 06:37 AM    HDL 42 08/10/2020 06:37 AM     (H) 08/10/2020 06:37 AM       Imaging: No results found.    A/P  Reviewed labs. Patient leaving the state so there is no follow up  Return if symptoms worsen or fail to improve.    Problem List Items Addressed This Visit     Hyperlipidemia with target LDL less than 130    Relevant Medications    pravastatin (PRAVACHOL) 40 MG tablet    Dementia (HCC)    Relevant Medications    memantine (NAMENDA) 10 MG Tab      Other Visit Diagnoses     Macrocytosis        Relevant Orders    VITAMIN B12    FOLATE          Portions of this note may be dictated using Dragon NaturallySpeaSatago voice recognition software.  Variances in spelling and vocabulary are possible and unintentional.  Not all areas may be caught/corrected.  Please notify me if any discrepancies are noted or if the meaning of any statement is not correct/clear.

## 2020-08-19 ENCOUNTER — TELEPHONE (OUTPATIENT)
Dept: MEDICAL GROUP | Age: 75
End: 2020-08-19

## 2020-08-19 NOTE — TELEPHONE ENCOUNTER
ESTABLISHED PATIENT PRE-VISIT PLANNING     Patient was NOT contacted to complete PVP.     Note: Patient will not be contacted if there is no indication to call.     1.  Reviewed notes from the last few office visits within the medical group: Yes    2.  If any orders were placed at last visit or intended to be done for this visit (i.e. 6 mos follow-up), do we have Results/Consult Notes?        •  Labs - Labs ordered, completed on 8/10/2020 and results are in chart.   Note: If patient appointment is for lab review and patient did not complete labs, check with provider if OK to reschedule patient until labs completed.       •  Imaging - Imaging was not ordered at last office visit.       •  Referrals - No referrals were ordered at last office visit.    3. Is this appointment scheduled as a Hospital Follow-Up? No    4.  Immunizations were updated in Epic using WebIZ?: Epic matches WebIZ       •  Web Iz Recommendations: Patient is up to date on all vaccines    5.  Patient is due for the following Health Maintenance Topics:   Health Maintenance Due   Topic Date Due   • Annual Wellness Visit  01/04/2019           6. Orders for overdue Health Maintenance topics pended in Pre-Charting? N\A    7.  AHA (MDX) form printed for Provider? No, already completed    8.  Patient was NOT informed to arrive 15 min prior to their scheduled appointment and bring in their medication bottles.

## 2020-08-26 ENCOUNTER — APPOINTMENT (RX ONLY)
Dept: URBAN - METROPOLITAN AREA CLINIC 4 | Facility: CLINIC | Age: 75
Setting detail: DERMATOLOGY
End: 2020-08-26

## 2020-08-26 DIAGNOSIS — D18.0 HEMANGIOMA: ICD-10-CM

## 2020-08-26 DIAGNOSIS — L81.4 OTHER MELANIN HYPERPIGMENTATION: ICD-10-CM

## 2020-08-26 DIAGNOSIS — Z85.820 PERSONAL HISTORY OF MALIGNANT MELANOMA OF SKIN: ICD-10-CM

## 2020-08-26 DIAGNOSIS — D22 MELANOCYTIC NEVI: ICD-10-CM

## 2020-08-26 DIAGNOSIS — Z71.89 OTHER SPECIFIED COUNSELING: ICD-10-CM

## 2020-08-26 DIAGNOSIS — L82.1 OTHER SEBORRHEIC KERATOSIS: ICD-10-CM

## 2020-08-26 DIAGNOSIS — L57.0 ACTINIC KERATOSIS: ICD-10-CM

## 2020-08-26 PROBLEM — D18.01 HEMANGIOMA OF SKIN AND SUBCUTANEOUS TISSUE: Status: ACTIVE | Noted: 2020-08-26

## 2020-08-26 PROBLEM — D22.9 MELANOCYTIC NEVI, UNSPECIFIED: Status: ACTIVE | Noted: 2020-08-26

## 2020-08-26 PROCEDURE — ? COUNSELING

## 2020-08-26 PROCEDURE — ? LIQUID NITROGEN

## 2020-08-26 PROCEDURE — 99213 OFFICE O/P EST LOW 20 MIN: CPT | Mod: 25

## 2020-08-26 PROCEDURE — 17000 DESTRUCT PREMALG LESION: CPT

## 2020-08-26 PROCEDURE — 17003 DESTRUCT PREMALG LES 2-14: CPT

## 2020-08-26 ASSESSMENT — LOCATION SIMPLE DESCRIPTION DERM
LOCATION SIMPLE: NOSE
LOCATION SIMPLE: LEFT UPPER BACK
LOCATION SIMPLE: LEFT CHEEK

## 2020-08-26 ASSESSMENT — LOCATION ZONE DERM
LOCATION ZONE: NOSE
LOCATION ZONE: TRUNK
LOCATION ZONE: FACE

## 2020-08-26 ASSESSMENT — LOCATION DETAILED DESCRIPTION DERM
LOCATION DETAILED: NASAL DORSUM
LOCATION DETAILED: LEFT SUPERIOR LATERAL MALAR CHEEK
LOCATION DETAILED: LEFT NASAL DORSUM
LOCATION DETAILED: LEFT SUPERIOR UPPER BACK

## 2020-08-26 NOTE — PROCEDURE: LIQUID NITROGEN
Consent: The patient's consent was obtained including but not limited to risks of crusting, scabbing, blistering, scarring, darker or lighter pigmentary change, recurrence, incomplete removal and infection.
Number Of Freeze-Thaw Cycles: 2 freeze-thaw cycles
Render Note In Bullet Format When Appropriate: No
Duration Of Freeze Thaw-Cycle (Seconds): 2
Post-Care Instructions: I reviewed with the patient in detail post-care instructions. Patient is to wear sunprotection, and avoid picking at any of the treated lesions. Pt may apply Vaseline to crusted or scabbing areas.
Detail Level: Simple

## 2021-01-11 DIAGNOSIS — Z23 NEED FOR VACCINATION: ICD-10-CM

## 2021-07-15 NOTE — PROCEDURE: COUNSELING
Detail Level: Detailed
When Should The Patient Follow-Up For Their Next Full-Body Skin Exam?: 6 Months
Quality 137: Melanoma: Continuity Of Care - Recall System: Patient information entered into a recall system that includes: target date for the next exam specified AND a process to follow up with patients regarding missed or unscheduled appointments
Detail Level: Zone
diffuse

## 2023-01-12 ENCOUNTER — TELEPHONE (OUTPATIENT)
Dept: MEDICAL GROUP | Facility: IMAGING CENTER | Age: 78
End: 2023-01-12

## (undated) DEVICE — TUBING PUMP WITH CONNECTOR REDEUCE (1EA)

## (undated) DEVICE — SYRINGE 30 ML LL (56/BX)

## (undated) DEVICE — SODIUM CHL. IRRIGATION 0.9% 3000ML (4EA/CA 65CA/PF)

## (undated) DEVICE — PADDING CAST 6 IN STERILE - 6 X 4 YDS (24/CA)

## (undated) DEVICE — GLOVE BIOGEL SZ 8 SURGICAL PF LTX - (50PR/BX 4BX/CA)

## (undated) DEVICE — DRAPE IOBAN II INCISE 23X17 - (10EA/BX 4BX/CA)

## (undated) DEVICE — ELECTRODE DUAL RETURN W/ CORD - (50/PK)

## (undated) DEVICE — LEGGINGS IN-VIEW CLEAR POLY - (20PR/CA)

## (undated) DEVICE — PACK KNEE ARTHROSCOPY SM OR - (2EA/CA)

## (undated) DEVICE — DRAPE LARGE 3 QUARTER - (20/CA)

## (undated) DEVICE — PROTECTOR ULNA NERVE - (36PR/CA)

## (undated) DEVICE — GLOVE, LITE (PAIR)

## (undated) DEVICE — CHLORAPREP 26 ML APPLICATOR - ORANGE TINT(25/CA)

## (undated) DEVICE — SHAVER SMOOTH BITE 3.5 (5EA/BX)

## (undated) DEVICE — TUBING, SPIROMETRY KIT

## (undated) DEVICE — MASK, LARYNGEAL AIRWAY #4

## (undated) DEVICE — SUTURE 3-0 ETHILON FS-1 - (36/BX) 30 INCH

## (undated) DEVICE — DRAPE U ORTHOPEDIC - (10/BX)

## (undated) DEVICE — NEEDLE SAFETY 18 GA X 1 1/2 IN (100EA/BX)

## (undated) DEVICE — GLOVE BIOGEL ECLIPSE PF LATEX SIZE 8.0  (50PR/BX)

## (undated) DEVICE — SHAVER4.0 AGGRESSIVE + FORMLA (5EA/BX)

## (undated) DEVICE — SENSOR SPO2 NEO LNCS ADHESIVE (20/BX) SEE USER NOTES

## (undated) DEVICE — SUTURE GENERAL

## (undated) DEVICE — KIT ROOM DECONTAMINATION

## (undated) DEVICE — NEPTUNE 4 PORT MANIFOLD - (20/PK)

## (undated) DEVICE — SPONGE GAUZE STER 4X4 8-PL - (2/PK 50PK/BX 12BX/CS)